# Patient Record
Sex: FEMALE | Race: WHITE | NOT HISPANIC OR LATINO | URBAN - METROPOLITAN AREA
[De-identification: names, ages, dates, MRNs, and addresses within clinical notes are randomized per-mention and may not be internally consistent; named-entity substitution may affect disease eponyms.]

---

## 2017-01-03 ENCOUNTER — OFFICE VISIT - HEALTHEAST (OUTPATIENT)
Dept: FAMILY MEDICINE | Facility: CLINIC | Age: 47
End: 2017-01-03

## 2017-01-03 DIAGNOSIS — S39.012A BACK STRAIN, INITIAL ENCOUNTER: ICD-10-CM

## 2017-02-14 ENCOUNTER — OFFICE VISIT - HEALTHEAST (OUTPATIENT)
Dept: INTERNAL MEDICINE | Facility: CLINIC | Age: 47
End: 2017-02-14

## 2017-02-14 DIAGNOSIS — R14.0 BLOATING: ICD-10-CM

## 2017-02-14 DIAGNOSIS — G56.02 CARPAL TUNNEL SYNDROME OF LEFT WRIST: ICD-10-CM

## 2017-02-14 DIAGNOSIS — F10.90 ALCOHOL USE DISORDER: ICD-10-CM

## 2017-02-14 DIAGNOSIS — Z00.00 HEALTH CARE MAINTENANCE: ICD-10-CM

## 2017-02-15 ENCOUNTER — COMMUNICATION - HEALTHEAST (OUTPATIENT)
Dept: INTERNAL MEDICINE | Facility: CLINIC | Age: 47
End: 2017-02-15

## 2017-02-21 ENCOUNTER — RECORDS - HEALTHEAST (OUTPATIENT)
Dept: ADMINISTRATIVE | Facility: OTHER | Age: 47
End: 2017-02-21

## 2017-02-24 ENCOUNTER — RECORDS - HEALTHEAST (OUTPATIENT)
Dept: ADMINISTRATIVE | Facility: OTHER | Age: 47
End: 2017-02-24

## 2017-02-28 ENCOUNTER — HOSPITAL ENCOUNTER (OUTPATIENT)
Dept: MAMMOGRAPHY | Facility: HOSPITAL | Age: 47
Discharge: HOME | End: 2017-02-28

## 2017-02-28 DIAGNOSIS — Z00.00 HEALTH CARE MAINTENANCE: ICD-10-CM

## 2017-03-09 ENCOUNTER — COMMUNICATION - HEALTHEAST (OUTPATIENT)
Dept: INTERNAL MEDICINE | Facility: CLINIC | Age: 47
End: 2017-03-09

## 2017-03-15 ENCOUNTER — OFFICE VISIT - HEALTHEAST (OUTPATIENT)
Dept: FAMILY MEDICINE | Facility: CLINIC | Age: 47
End: 2017-03-15

## 2017-03-15 DIAGNOSIS — M25.532 LEFT WRIST PAIN: ICD-10-CM

## 2017-03-30 ENCOUNTER — OFFICE VISIT - HEALTHEAST (OUTPATIENT)
Dept: BEHAVIORAL HEALTH | Facility: CLINIC | Age: 47
End: 2017-03-30

## 2017-03-30 DIAGNOSIS — F10.10 MILD ALCOHOL USE DISORDER: ICD-10-CM

## 2017-03-30 DIAGNOSIS — F33.1 MAJOR DEPRESSIVE DISORDER, RECURRENT EPISODE, MODERATE (H): ICD-10-CM

## 2017-04-05 ENCOUNTER — RECORDS - HEALTHEAST (OUTPATIENT)
Dept: ADMINISTRATIVE | Facility: OTHER | Age: 47
End: 2017-04-05

## 2017-04-14 ENCOUNTER — HOSPITAL ENCOUNTER (OUTPATIENT)
Dept: MRI IMAGING | Facility: HOSPITAL | Age: 47
Discharge: HOME | End: 2017-04-14
Attending: PSYCHIATRY & NEUROLOGY

## 2017-04-14 DIAGNOSIS — R20.2 PARESTHESIA: ICD-10-CM

## 2017-06-26 ENCOUNTER — COMMUNICATION - HEALTHEAST (OUTPATIENT)
Dept: INTERNAL MEDICINE | Facility: CLINIC | Age: 47
End: 2017-06-26

## 2018-04-17 ENCOUNTER — RECORDS - HEALTHEAST (OUTPATIENT)
Dept: ADMINISTRATIVE | Facility: OTHER | Age: 48
End: 2018-04-17

## 2018-04-21 ENCOUNTER — RECORDS - HEALTHEAST (OUTPATIENT)
Dept: ADMINISTRATIVE | Facility: OTHER | Age: 48
End: 2018-04-21

## 2018-07-25 ENCOUNTER — OFFICE VISIT - HEALTHEAST (OUTPATIENT)
Dept: FAMILY MEDICINE | Facility: CLINIC | Age: 48
End: 2018-07-25

## 2018-07-25 DIAGNOSIS — G47.00 INSOMNIA: ICD-10-CM

## 2018-07-25 DIAGNOSIS — Z72.0 TOBACCO USE: ICD-10-CM

## 2018-07-25 DIAGNOSIS — F32.1 MODERATE SINGLE CURRENT EPISODE OF MAJOR DEPRESSIVE DISORDER (H): ICD-10-CM

## 2018-07-25 DIAGNOSIS — G56.02 CARPAL TUNNEL SYNDROME OF LEFT WRIST: ICD-10-CM

## 2018-07-25 DIAGNOSIS — Z12.31 VISIT FOR SCREENING MAMMOGRAM: ICD-10-CM

## 2018-07-25 DIAGNOSIS — Z00.00 ROUTINE GENERAL MEDICAL EXAMINATION AT A HEALTH CARE FACILITY: ICD-10-CM

## 2018-07-25 LAB
ALBUMIN SERPL-MCNC: 4 G/DL (ref 3.5–5)
ALP SERPL-CCNC: 72 U/L (ref 45–120)
ALT SERPL W P-5'-P-CCNC: 26 U/L (ref 0–45)
ANION GAP SERPL CALCULATED.3IONS-SCNC: 8 MMOL/L (ref 5–18)
AST SERPL W P-5'-P-CCNC: 31 U/L (ref 0–40)
BASOPHILS # BLD AUTO: 0 THOU/UL (ref 0–0.2)
BASOPHILS NFR BLD AUTO: 1 % (ref 0–2)
BILIRUB SERPL-MCNC: 0.7 MG/DL (ref 0–1)
BUN SERPL-MCNC: 10 MG/DL (ref 8–22)
CALCIUM SERPL-MCNC: 10.1 MG/DL (ref 8.5–10.5)
CHLORIDE BLD-SCNC: 106 MMOL/L (ref 98–107)
CHOLEST SERPL-MCNC: 232 MG/DL
CO2 SERPL-SCNC: 27 MMOL/L (ref 22–31)
CREAT SERPL-MCNC: 0.74 MG/DL (ref 0.6–1.1)
EOSINOPHIL # BLD AUTO: 0.2 THOU/UL (ref 0–0.4)
EOSINOPHIL NFR BLD AUTO: 2 % (ref 0–6)
ERYTHROCYTE [DISTWIDTH] IN BLOOD BY AUTOMATED COUNT: 10.8 % (ref 11–14.5)
FASTING STATUS PATIENT QL REPORTED: ABNORMAL
GFR SERPL CREATININE-BSD FRML MDRD: >60 ML/MIN/1.73M2
GLUCOSE BLD-MCNC: 110 MG/DL (ref 70–125)
HCT VFR BLD AUTO: 45 % (ref 35–47)
HDLC SERPL-MCNC: 84 MG/DL
HGB BLD-MCNC: 15 G/DL (ref 12–16)
LDLC SERPL CALC-MCNC: 117 MG/DL
LYMPHOCYTES # BLD AUTO: 1.9 THOU/UL (ref 0.8–4.4)
LYMPHOCYTES NFR BLD AUTO: 27 % (ref 20–40)
MCH RBC QN AUTO: 33.6 PG (ref 27–34)
MCHC RBC AUTO-ENTMCNC: 33.2 G/DL (ref 32–36)
MCV RBC AUTO: 101 FL (ref 80–100)
MONOCYTES # BLD AUTO: 0.6 THOU/UL (ref 0–0.9)
MONOCYTES NFR BLD AUTO: 8 % (ref 2–10)
NEUTROPHILS # BLD AUTO: 4.3 THOU/UL (ref 2–7.7)
NEUTROPHILS NFR BLD AUTO: 62 % (ref 50–70)
PLATELET # BLD AUTO: 152 THOU/UL (ref 140–440)
PMV BLD AUTO: 8.1 FL (ref 7–10)
POTASSIUM BLD-SCNC: 4.5 MMOL/L (ref 3.5–5)
PROT SERPL-MCNC: 7 G/DL (ref 6–8)
RBC # BLD AUTO: 4.45 MILL/UL (ref 3.8–5.4)
SODIUM SERPL-SCNC: 141 MMOL/L (ref 136–145)
TRIGL SERPL-MCNC: 155 MG/DL
TSH SERPL DL<=0.005 MIU/L-ACNC: 0.97 UIU/ML (ref 0.3–5)
WBC: 6.9 THOU/UL (ref 4–11)

## 2018-07-25 ASSESSMENT — MIFFLIN-ST. JEOR: SCORE: 1232.09

## 2018-07-26 ENCOUNTER — COMMUNICATION - HEALTHEAST (OUTPATIENT)
Dept: FAMILY MEDICINE | Facility: CLINIC | Age: 48
End: 2018-07-26

## 2018-07-27 ENCOUNTER — HOSPITAL ENCOUNTER (OUTPATIENT)
Dept: MAMMOGRAPHY | Facility: CLINIC | Age: 48
Discharge: HOME | End: 2018-07-27
Attending: FAMILY MEDICINE

## 2018-07-27 DIAGNOSIS — Z12.31 VISIT FOR SCREENING MAMMOGRAM: ICD-10-CM

## 2018-07-31 ENCOUNTER — COMMUNICATION - HEALTHEAST (OUTPATIENT)
Dept: FAMILY MEDICINE | Facility: CLINIC | Age: 48
End: 2018-07-31

## 2018-08-01 ENCOUNTER — COMMUNICATION - HEALTHEAST (OUTPATIENT)
Dept: FAMILY MEDICINE | Facility: CLINIC | Age: 48
End: 2018-08-01

## 2018-08-01 LAB
BKR LAB AP ABNORMAL BLEEDING: YES
BKR LAB AP BIRTH CONTROL/HORMONES: NORMAL
BKR LAB AP CERVICAL APPEARANCE: NORMAL
BKR LAB AP GYN ADEQUACY: NORMAL
BKR LAB AP GYN INTERPRETATION: NORMAL
BKR LAB AP HPV REFLEX: NORMAL
BKR LAB AP LMP: 2007
BKR LAB AP PATIENT STATUS: NORMAL
BKR LAB AP PREVIOUS ABNORMAL: NORMAL
BKR LAB AP PREVIOUS NORMAL: 2015
HIGH RISK?: NO
PATH REPORT.COMMENTS IMP SPEC: NORMAL
RESULT FLAG (HE HISTORICAL CONVERSION): NORMAL

## 2018-09-17 ENCOUNTER — OFFICE VISIT - HEALTHEAST (OUTPATIENT)
Dept: FAMILY MEDICINE | Facility: CLINIC | Age: 48
End: 2018-09-17

## 2018-09-17 DIAGNOSIS — R03.0 ELEVATED BP WITHOUT DIAGNOSIS OF HYPERTENSION: ICD-10-CM

## 2018-09-17 ASSESSMENT — MIFFLIN-ST. JEOR: SCORE: 1240.03

## 2018-09-20 ENCOUNTER — OFFICE VISIT - HEALTHEAST (OUTPATIENT)
Dept: FAMILY MEDICINE | Facility: CLINIC | Age: 48
End: 2018-09-20

## 2018-09-20 DIAGNOSIS — I10 ESSENTIAL HYPERTENSION, BENIGN: ICD-10-CM

## 2018-09-20 DIAGNOSIS — Z72.0 TOBACCO USE: ICD-10-CM

## 2018-09-20 DIAGNOSIS — G56.02 CARPAL TUNNEL SYNDROME OF LEFT WRIST: ICD-10-CM

## 2018-09-20 ASSESSMENT — MIFFLIN-ST. JEOR: SCORE: 1267.7

## 2018-11-15 ENCOUNTER — COMMUNICATION - HEALTHEAST (OUTPATIENT)
Dept: FAMILY MEDICINE | Facility: CLINIC | Age: 48
End: 2018-11-15

## 2018-11-15 DIAGNOSIS — R03.0 ELEVATED BP WITHOUT DIAGNOSIS OF HYPERTENSION: ICD-10-CM

## 2018-11-15 DIAGNOSIS — Z71.6 ENCOUNTER FOR TOBACCO USE CESSATION COUNSELING: ICD-10-CM

## 2018-11-15 DIAGNOSIS — G47.00 INSOMNIA: ICD-10-CM

## 2018-11-17 ENCOUNTER — COMMUNICATION - HEALTHEAST (OUTPATIENT)
Dept: SCHEDULING | Facility: CLINIC | Age: 48
End: 2018-11-17

## 2019-01-28 ENCOUNTER — OFFICE VISIT - HEALTHEAST (OUTPATIENT)
Dept: FAMILY MEDICINE | Facility: CLINIC | Age: 49
End: 2019-01-28

## 2019-01-28 DIAGNOSIS — R14.0 BLOATING SYMPTOM: ICD-10-CM

## 2019-01-28 DIAGNOSIS — G56.02 CARPAL TUNNEL SYNDROME OF LEFT WRIST: ICD-10-CM

## 2019-01-28 DIAGNOSIS — G47.00 INSOMNIA, UNSPECIFIED TYPE: ICD-10-CM

## 2019-01-28 DIAGNOSIS — F33.2 SEVERE EPISODE OF RECURRENT MAJOR DEPRESSIVE DISORDER, WITHOUT PSYCHOTIC FEATURES (H): ICD-10-CM

## 2019-01-28 DIAGNOSIS — I10 ESSENTIAL HYPERTENSION, BENIGN: ICD-10-CM

## 2019-01-28 DIAGNOSIS — R19.7 DIARRHEA, UNSPECIFIED TYPE: ICD-10-CM

## 2019-01-28 ASSESSMENT — MIFFLIN-ST. JEOR: SCORE: 1263.28

## 2019-05-13 ENCOUNTER — COMMUNICATION - HEALTHEAST (OUTPATIENT)
Dept: FAMILY MEDICINE | Facility: CLINIC | Age: 49
End: 2019-05-13

## 2019-05-13 DIAGNOSIS — F32.1 MODERATE SINGLE CURRENT EPISODE OF MAJOR DEPRESSIVE DISORDER (H): ICD-10-CM

## 2019-05-15 ENCOUNTER — COMMUNICATION - HEALTHEAST (OUTPATIENT)
Dept: FAMILY MEDICINE | Facility: CLINIC | Age: 49
End: 2019-05-15

## 2019-07-24 ENCOUNTER — OFFICE VISIT - HEALTHEAST (OUTPATIENT)
Dept: FAMILY MEDICINE | Facility: CLINIC | Age: 49
End: 2019-07-24

## 2019-07-24 ENCOUNTER — COMMUNICATION - HEALTHEAST (OUTPATIENT)
Dept: FAMILY MEDICINE | Facility: CLINIC | Age: 49
End: 2019-07-24

## 2019-07-24 DIAGNOSIS — G47.00 INSOMNIA, UNSPECIFIED TYPE: ICD-10-CM

## 2019-07-24 DIAGNOSIS — Z71.6 ENCOUNTER FOR TOBACCO USE CESSATION COUNSELING: ICD-10-CM

## 2019-07-24 DIAGNOSIS — G56.02 CARPAL TUNNEL SYNDROME OF LEFT WRIST: ICD-10-CM

## 2019-07-24 DIAGNOSIS — F41.9 ANXIETY: ICD-10-CM

## 2019-07-24 DIAGNOSIS — F32.1 CURRENT MODERATE EPISODE OF MAJOR DEPRESSIVE DISORDER, UNSPECIFIED WHETHER RECURRENT (H): ICD-10-CM

## 2019-07-24 DIAGNOSIS — F32.1 MODERATE SINGLE CURRENT EPISODE OF MAJOR DEPRESSIVE DISORDER (H): ICD-10-CM

## 2019-07-24 RX ORDER — NICOTINE 21 MG/24HR
1 PATCH, TRANSDERMAL 24 HOURS TRANSDERMAL EVERY 24 HOURS
Qty: 30 PATCH | Refills: 3 | Status: SHIPPED | OUTPATIENT
Start: 2019-07-24 | End: 2021-11-03

## 2019-07-24 RX ORDER — TRAMADOL HYDROCHLORIDE 200 MG/1
200 TABLET, EXTENDED RELEASE ORAL DAILY
Qty: 30 TABLET | Refills: 0 | Status: SHIPPED | OUTPATIENT
Start: 2019-07-24 | End: 2021-11-03

## 2019-07-24 ASSESSMENT — MIFFLIN-ST. JEOR: SCORE: 1225.97

## 2019-07-26 ENCOUNTER — COMMUNICATION - HEALTHEAST (OUTPATIENT)
Dept: FAMILY MEDICINE | Facility: CLINIC | Age: 49
End: 2019-07-26

## 2019-07-29 ENCOUNTER — COMMUNICATION - HEALTHEAST (OUTPATIENT)
Dept: FAMILY MEDICINE | Facility: CLINIC | Age: 49
End: 2019-07-29

## 2019-08-04 ENCOUNTER — COMMUNICATION - HEALTHEAST (OUTPATIENT)
Dept: FAMILY MEDICINE | Facility: CLINIC | Age: 49
End: 2019-08-04

## 2019-08-04 DIAGNOSIS — F32.1 MODERATE SINGLE CURRENT EPISODE OF MAJOR DEPRESSIVE DISORDER (H): ICD-10-CM

## 2019-08-06 ENCOUNTER — RECORDS - HEALTHEAST (OUTPATIENT)
Dept: ADMINISTRATIVE | Facility: OTHER | Age: 49
End: 2019-08-06

## 2019-11-24 ENCOUNTER — COMMUNICATION - HEALTHEAST (OUTPATIENT)
Dept: FAMILY MEDICINE | Facility: CLINIC | Age: 49
End: 2019-11-24

## 2019-11-24 DIAGNOSIS — G47.00 INSOMNIA: ICD-10-CM

## 2019-11-24 RX ORDER — TRAZODONE HYDROCHLORIDE 100 MG/1
100 TABLET ORAL AT BEDTIME
Qty: 30 TABLET | Refills: 9 | Status: SHIPPED | OUTPATIENT
Start: 2019-11-24 | End: 2021-11-03

## 2020-04-21 ENCOUNTER — COMMUNICATION - HEALTHEAST (OUTPATIENT)
Dept: FAMILY MEDICINE | Facility: CLINIC | Age: 50
End: 2020-04-21

## 2020-04-21 DIAGNOSIS — F32.1 MODERATE SINGLE CURRENT EPISODE OF MAJOR DEPRESSIVE DISORDER (H): ICD-10-CM

## 2020-05-04 ENCOUNTER — COMMUNICATION - HEALTHEAST (OUTPATIENT)
Dept: FAMILY MEDICINE | Facility: CLINIC | Age: 50
End: 2020-05-04

## 2020-05-04 DIAGNOSIS — F41.9 ANXIETY: ICD-10-CM

## 2020-07-17 ENCOUNTER — COMMUNICATION - HEALTHEAST (OUTPATIENT)
Dept: FAMILY MEDICINE | Facility: CLINIC | Age: 50
End: 2020-07-17

## 2020-07-17 DIAGNOSIS — F41.9 ANXIETY: ICD-10-CM

## 2020-07-18 RX ORDER — BUSPIRONE HYDROCHLORIDE 5 MG/1
5 TABLET ORAL 2 TIMES DAILY
Qty: 180 TABLET | Refills: 0 | Status: SHIPPED | OUTPATIENT
Start: 2020-07-18 | End: 2021-11-03

## 2021-05-16 ENCOUNTER — COMMUNICATION - HEALTHEAST (OUTPATIENT)
Dept: FAMILY MEDICINE | Facility: CLINIC | Age: 51
End: 2021-05-16

## 2021-05-16 DIAGNOSIS — F32.1 MODERATE SINGLE CURRENT EPISODE OF MAJOR DEPRESSIVE DISORDER (H): ICD-10-CM

## 2021-05-17 ENCOUNTER — COMMUNICATION - HEALTHEAST (OUTPATIENT)
Dept: FAMILY MEDICINE | Facility: CLINIC | Age: 51
End: 2021-05-17

## 2021-05-28 NOTE — TELEPHONE ENCOUNTER
Patient Returning Call  Reason for call:  Medication refill  Information relayed to patient:  Scheduled appointment for 5/14 at 3:00  Patient has additional questions:  No  If YES, what are your questions/concerns:    Okay to leave a detailed message?: No call back needed

## 2021-05-30 VITALS — WEIGHT: 129.9 LBS | BODY MASS INDEX: 20.35 KG/M2

## 2021-05-30 VITALS — BODY MASS INDEX: 21.07 KG/M2 | WEIGHT: 134.5 LBS

## 2021-05-30 VITALS — WEIGHT: 134.5 LBS | BODY MASS INDEX: 21.07 KG/M2

## 2021-05-30 NOTE — TELEPHONE ENCOUNTER
Prior Authorization Request  Who s requesting:  Patient  Pharmacy Name and Location: [id number] [facility] [[place]][address]  Medication Name:   traZODone (DESYREL) 100 MG tablet 30 tablet 11 11/17/2018  No   Sig - Route: Take 1 tablet (100 mg total) by mouth at bedtime. - Oral   Sent to pharmacy as: traZODone (DESYREL) 100 MG tablet       Insurance Plan: MEDICA  Insurance Member ID Number:  [id number]  Informed patient that prior authorizations can take up to 10 business days for response:   Yes  Okay to leave a detailed message: Yes

## 2021-05-30 NOTE — PROGRESS NOTES
Family Medicine Office Visit  [facility]  Patient Name: Angelic Damon  Patient Age: 49 y.o.  YOB: 1970  MRN: [id number]    Date of Visit: 2019  Reason for Office Visit:   Chief Complaint   Patient presents with     med check     tramadol- anxiety      Left wrist pain           Assessment / Plan / Medical Decision Makin. Encounter for tobacco use cessation counseling  - nicotine (NICODERM CQ) 21 mg/24 hr; Place 1 patch on the skin daily.  Dispense: 30 patch; Refill: 3    2. Carpal tunnel syndrome of left wrist  Refill medication and will do another referral to orthopedics  - traMADol (ULTRAM-ER) 200 MG 24 hr tablet; Take 1 tablet (200 mg total) by mouth daily.  Dispense: 30 tablet; Refill: 0  - Ambulatory referral to Orthopedics    3. Insomnia, unspecified type  Will see if related to anxiety and improve with medication changes    4. Current moderate episode of major depressive disorder, unspecified whether recurrent (H)  PHQ 9 done and noted to be 14 - plan to increase medication    5. Anxiety  Start buspar and see if any improvement in symptoms  - busPIRone (BUSPAR) 5 MG tablet; Take 1 tablet (5 mg total) by mouth 2 (two) times a day.  Dispense: 60 tablet; Refill: 3    6. Moderate single current episode of major depressive disorder (H)  Will increase the zoloft to 100mg and see if any  improvement  - sertraline (ZOLOFT) 100 MG tablet; Take 1 tablet (100 mg total) by mouth daily.  Dispense: 30 tablet; Refill: 6        Health Maintenance Review  Health Maintenance   Topic Date Due     DEPRESSION FOLLOW UP  1970     HIV SCREENING  1986     MAMMOGRAM  2019     PREVENTIVE CARE VISIT  2019     INFLUENZA VACCINE RULE BASED (1) 2019     ADVANCE CARE PLANNING  2022     TD 18+ HE  2023     PAP SMEAR  2023     TDAP ADULT ONE TIME DOSE  Addressed         I have changed Alison Damon's sertraline. I am also having  her start on busPIRone. Additionally, I am having her maintain her calcium-vitamin D, traZODone, nicotine, and traMADol.      HPI:  Angelic Damon is a 49 y.o. year old who presents to the office today for follow up on anxiety.  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  Planning on moving but too much drama with family she says and not doing that any longer.  Currently on zoloft for anxiety and depression and feels like helping more with the depression.  Taking trazodone for sleeping and not like to take it as it makes her groggy but then restless all night.  PHQ9 14.      Needing to get carpal tunnel surgery and not followed through with it due to work.  Smoking still and trying to quit.        Review of Systems- pertinent positive in bold:  Constitutional: Fever, chills, night sweats, fainting, weight change, fatigue, seizures, dizziness, sleeping difficulties, loud snoring/pauses in breathing  Eyes: change in vision, blurred or double vision, redness/eye pain  Ears, nose, mouth, throat: change in hearing, ear pain, hoarseness, difficulty swallowing, sores in the mouth or throat  Respiratory: shortness of breath, cough, bloody sputum, wheezing  Cardiovascular: chest pain, palpitations   Gastrointestinal: abdominal pain, heartburn/indigestion, nausea/vomiting, change in appetite, change in bowel habits, constipation or diarrhea, rectal bleeding/dark stools, difficulty swallowing  Urinary: painful urination, frequent urination, urinary urgency/incontinence, blood in urine/dark urine, nocturia  Musculoskeletal: backache/back pain (new or increasing), weakness, joint pain/stiffness (new or increasing), muscle cramps, swelling of hands, feet, ankles, leg pain/redness  Skin: change in moles/freckles, rash, nodules  Hematologic/lymphatic: swollen lymph glands, abnormal bruising/bleeding  Endocrine: excessive thirst/urination, cold or heat intolerance  Neurologic/emotional: worrisome  "memory change, numbness/tingling, anxiety, mood swings      Current Scheduled Meds:  Outpatient Encounter Medications as of 7/24/2019   Medication Sig Dispense Refill     calcium-vitamin D 500 mg(1,250mg) -200 unit per tablet Take 1 tablet by mouth 2 (two) times a day with meals. 180 tablet 0     sertraline (ZOLOFT) 100 MG tablet Take 1 tablet (100 mg total) by mouth daily. 30 tablet 6     traMADol (ULTRAM-ER) 200 MG 24 hr tablet Take 1 tablet (200 mg total) by mouth daily. 30 tablet 0     traZODone (DESYREL) 100 MG tablet Take 1 tablet (100 mg total) by mouth at bedtime. 30 tablet 11     [DISCONTINUED] sertraline (ZOLOFT) 50 MG tablet Take 1 tablet (50 mg total) by mouth daily. 30 tablet 2     [DISCONTINUED] traMADol (ULTRAM-ER) 200 MG 24 hr tablet Take 1 tablet (200 mg total) by mouth daily. 30 tablet 0     busPIRone (BUSPAR) 5 MG tablet Take 1 tablet (5 mg total) by mouth 2 (two) times a day. 60 tablet 3     nicotine (NICODERM CQ) 21 mg/24 hr Place 1 patch on the skin daily. 30 patch 3     [DISCONTINUED] nicotine (NICODERM CQ) 21 mg/24 hr Place 1 patch on the skin daily. 30 patch 11     No facility-administered encounter medications on file as of 7/24/2019.      Past Medical History:   Diagnosis Date     Alcoholism (H)      Anxiety      Arthritis      Depression      Postmenopausal      No past surgical history on file.  Social History     Tobacco Use     Smoking status: Current Some Day Smoker     Packs/day: 1.00     Years: 12.00     Pack years: 12.00     Smokeless tobacco: Never Used   Substance Use Topics     Alcohol use: Yes     Alcohol/week: 4.0 oz     Types: 8 Standard drinks or equivalent per week     Comment: 4 drinks/drinking episode. Usually wine or beer      Drug use: No       Objective / Physical Examination:  Vitals:    07/24/19 0920   BP: 116/74   Patient Site: Right Arm   Patient Position: Sitting   Cuff Size: Adult Regular   Pulse: 73   SpO2: 99%   Weight: 129 lb 14.4 oz (58.9 kg)   Height: 5' 6\" " (1.676 m)     Wt Readings from Last 3 Encounters:   07/24/19 129 lb 14.4 oz (58.9 kg)   01/28/19 138 lb 2 oz (62.7 kg)   11/11/18 128 lb (58.1 kg)     BP Readings from Last 3 Encounters:   07/24/19 116/74   01/28/19 138/80   11/12/18 (!) 156/99     Body mass index is 20.97 kg/m .   Results for orders placed or performed during the hospital encounter of 11/11/18   HM2 (CBC W/O DIFF)   Result Value Ref Range    WBC 6.9 4.0 - 11.0 thou/uL    RBC 4.00 3.80 - 5.40 mill/uL    Hemoglobin 13.3 12.0 - 16.0 g/dL    Hematocrit 39.3 35.0 - 47.0 %    MCV 98 80 - 100 fL    MCH 33.3 27.0 - 34.0 pg    MCHC 33.8 32.0 - 36.0 g/dL    RDW 12.3 11.0 - 14.5 %    Platelets 168 140 - 440 thou/uL    MPV 10.3 8.5 - 12.5 fL   Basic Metabolic Panel   Result Value Ref Range    Sodium 143 136 - 145 mmol/L    Potassium 4.0 3.5 - 5.0 mmol/L    Chloride 108 (H) 98 - 107 mmol/L    CO2 25 22 - 31 mmol/L    Anion Gap, Calculation 10 5 - 18 mmol/L    Glucose 79 70 - 125 mg/dL    Calcium 9.5 8.5 - 10.5 mg/dL    BUN 7 (L) 8 - 22 mg/dL    Creatinine 0.69 0.60 - 1.10 mg/dL    GFR MDRD Af Amer >60 >60 mL/min/1.73m2    GFR MDRD Non Af Amer >60 >60 mL/min/1.73m2   Lipase   Result Value Ref Range    Lipase 34 0 - 52 U/L   Hepatic Profile   Result Value Ref Range    Bilirubin, Total 0.2 0.0 - 1.0 mg/dL    Bilirubin, Direct <0.1 <=0.5 mg/dL    Protein, Total 7.3 6.0 - 8.0 g/dL    Albumin 4.1 3.5 - 5.0 g/dL    Alkaline Phosphatase 72 45 - 120 U/L    AST 30 0 - 40 U/L    ALT 26 0 - 45 U/L   Troponin I   Result Value Ref Range    Troponin I <0.01 0.00 - 0.29 ng/mL   Troponin I   Result Value Ref Range    Troponin I <0.01 0.00 - 0.29 ng/mL   ECG 12 lead nursing unit performed   Result Value Ref Range    SYSTOLIC BLOOD PRESSURE  mmHg    DIASTOLIC BLOOD PRESSURE  mmHg    VENTRICULAR RATE 78 BPM    ATRIAL RATE 78 BPM    P-R INTERVAL 150 ms    QRS DURATION 86 ms    Q-T INTERVAL 398 ms    QTC CALCULATION (BEZET) 453 ms    P Axis 70 degrees    R AXIS 49 degrees    T AXIS  43 degrees    MUSE DIAGNOSIS       Normal sinus rhythm  Possible Anterior infarct , age undetermined  Abnormal ECG  No previous ECGs available  Confirmed by RAJ EDUARDO MD LOC:JN ([id number]) on 11/12/2018 4:58:09 PM     ECG 12 lead nursing unit performed   Result Value Ref Range    SYSTOLIC BLOOD PRESSURE  mmHg    DIASTOLIC BLOOD PRESSURE  mmHg    VENTRICULAR RATE 59 BPM    ATRIAL RATE 59 BPM    P-R INTERVAL 164 ms    QRS DURATION 90 ms    Q-T INTERVAL 450 ms    QTC CALCULATION (BEZET) 445 ms    P Axis 68 degrees    R AXIS 53 degrees    T AXIS 48 degrees    MUSE DIAGNOSIS       Sinus bradycardia with sinus arrhythmia  Otherwise normal ECG  When compared with ECG of 11-NOV-2018 22:47,  No significant change was found  Confirmed by RAJ EDUARDO MD LOC:[id number]) on 11/12/2018 4:59:20 PM             General Appearance: Alert and oriented, cooperative, affect appropriate, speech clear, in no apparent distress  Head: Normocephalic, atraumatic  Lungs: Clear to auscultation bilaterally. Normal inspiratory and expiratory effort  Cardiovascular: Regular rate, normal S1, S2. No murmurs, rubs, or gallops  Abdomen: Bowel sounds active all four quadrants. Soft, non-tender. No hepatomegaly or splenomegaly. No bruits detected.   Extremities: Pulses 2+ and equal throughout. No edema. Strength equal throughout.  Integumentary: Warm and dry. Without suspicious looking lesions  Neuro: Alert and oriented, follows commands appropriately.     Orders Placed This Encounter   Procedures     Ambulatory referral to Orthopedics   Followup: No follow-ups on file. earlier if needed.    Total time spent with patient was 30 min with >50% of time spent in face-to-face counseling regarding the above plan       Toma Justin MD

## 2021-05-30 NOTE — TELEPHONE ENCOUNTER
Central PA team  [phone]  Pool: HE PA MED ([id number])          PA has been initiated.       PA form completed and faxed insurance via Cover My Meds     Key:  [id number]       Medication: traMADol (ULTRAM-ER) 200 MG 24 hr tablet    Insurance:  [facility]          Response will be received via fax and may take up to 5-10 business days depending on plan

## 2021-05-30 NOTE — TELEPHONE ENCOUNTER
Fax received from [facility], they have started the Prior Authorization Process via Cover My Meds    CoverMyMeds Key: [id number]     Medication Name:   traMADol (ULTRAM-ER) 200 MG 24 hr tablet 30 tablet 0 7/24/2019     Sig - Route: Take 1 tablet (200 mg total) by mouth daily. - Oral    Sent to pharmacy as: traMADol (ULTRAM-ER) 200 MG 24 hr tablet    E-Prescribing Status: Receipt confirmed by pharmacy (7/24/2019  9:35 AM CDT)          Insurance Plan: [employer]  PBM: [employer]  Patient ID:  Not provided on fax    Please complete the PA process

## 2021-05-30 NOTE — TELEPHONE ENCOUNTER
Medication Question or Clarification  Who is calling: Pharmacy: Adeline from [facility] in [place]  What medication are you calling about? (include dose and sig)     traMADol (ULTRAM-ER) 200 MG 24 hr tablet 30 tablet 0 7/24/2019     Sig - Route: Take 1 tablet (200 mg total) by mouth daily. - Oral      Who prescribed the medication?: pcp  What is your question/concern?: Pharmacy asking if this for an acute or chronic condition? Please advise.  Pharmacy: [facility] [place]  Okay to leave a detailed message?: Yes  Site CMT - Please call the pharmacy to obtain any additional needed information.

## 2021-05-31 NOTE — TELEPHONE ENCOUNTER
Medication does not require a prior auth.  Pharmacy needed a PA for tramadol, which has already been approved.

## 2021-05-31 NOTE — TELEPHONE ENCOUNTER
RN cannot approve Refill Request    RN can NOT refill this medication medication not on med list. Last office visit: 7/24/2019 Toma Justin MD Last Physical: 7/25/2018 Last MTM visit: Visit date not found Last visit same specialty: 7/24/2019 Toma Justin MD.  Next visit within 3 mo: Visit date not found  Next physical within 3 mo: Visit date not found  NOTE: dose increased on 7/24/2019--message sent to pharmacy.     Heather Thorne, Care Connection Triage/Med Refill 8/4/2019    Requested Prescriptions   Pending Prescriptions Disp Refills     sertraline (ZOLOFT) 50 MG tablet [Pharmacy Med Name: Sertraline HCl Oral Tablet 50 MG] 30 tablet 1     Sig: Take 1 tablet (50 mg total) by mouth daily.       SSRI Refill Protocol  Passed - 8/4/2019  7:01 AM        Passed - PCP or prescribing provider visit in last year     Last office visit with prescriber/PCP: 7/24/2019 Toma Justin MD OR same dept: 7/24/2019 Toma Justin MD OR same specialty: 7/24/2019 Toma Justin MD  Last physical: 7/25/2018 Last MTM visit: Visit date not found   Next visit within 3 mo: Visit date not found  Next physical within 3 mo: Visit date not found  Prescriber OR PCP: Toma Justin MD  Last diagnosis associated with med order: 1. Moderate single current episode of major depressive disorder (H)  - sertraline (ZOLOFT) 50 MG tablet [Pharmacy Med Name: Sertraline HCl Oral Tablet 50 MG]; Take 1 tablet (50 mg total) by mouth daily.  Dispense: 30 tablet; Refill: 1    If protocol passes may refill for 12 months if within 3 months of last provider visit (or a total of 15 months).

## 2021-06-01 ENCOUNTER — RECORDS - HEALTHEAST (OUTPATIENT)
Dept: ADMINISTRATIVE | Facility: CLINIC | Age: 51
End: 2021-06-01

## 2021-06-01 VITALS — BODY MASS INDEX: 21.38 KG/M2 | HEIGHT: 66 IN | WEIGHT: 133 LBS

## 2021-06-02 VITALS — BODY MASS INDEX: 21.66 KG/M2 | HEIGHT: 66 IN | WEIGHT: 134.75 LBS

## 2021-06-02 VITALS — BODY MASS INDEX: 22.2 KG/M2 | HEIGHT: 66 IN | WEIGHT: 138.13 LBS

## 2021-06-02 VITALS — HEIGHT: 66 IN | BODY MASS INDEX: 22.35 KG/M2 | WEIGHT: 139.1 LBS

## 2021-06-03 VITALS — WEIGHT: 129.9 LBS | BODY MASS INDEX: 20.88 KG/M2 | HEIGHT: 66 IN

## 2021-06-03 NOTE — TELEPHONE ENCOUNTER
Refill Approved    Rx renewed per Medication Renewal Policy. Medication was last renewed on 11/17/18.    Mackenzie Angulo, Care Connection Triage/Med Refill 11/24/2019     Requested Prescriptions   Pending Prescriptions Disp Refills     traZODone (DESYREL) 100 MG tablet [Pharmacy Med Name: traZODone HCl Oral Tablet 100 MG] 30 tablet 10     Sig: Take 1 tablet (100 mg total) by mouth at bedtime       Tricyclics/Misc Antidepressant/Antianxiety Meds Refill Protocol Passed - 11/24/2019  7:00 AM        Passed - PCP or prescribing provider visit in last year     Last office visit with prescriber/PCP: 7/24/2019 Toma Justin MD OR same dept: 7/24/2019 Toma Justin MD OR same specialty: 7/24/2019 Toma Justin MD  Last physical: 7/25/2018 Last MTM visit: Visit date not found   Next visit within 3 mo: Visit date not found  Next physical within 3 mo: Visit date not found  Prescriber OR PCP: Toma Justin MD  Last diagnosis associated with med order: 1. Insomnia  - traZODone (DESYREL) 100 MG tablet [Pharmacy Med Name: traZODone HCl Oral Tablet 100 MG]; Take 1 tablet (100 mg total) by mouth at bedtime.  Dispense: 30 tablet; Refill: 10    If protocol passes may refill for 12 months if within 3 months of last provider visit (or a total of 15 months).

## 2021-06-07 NOTE — TELEPHONE ENCOUNTER
Patient requested pended medication be refilled, Please sign if appropriate     Zoloft Oral Tablet 100mg     Last Filled- 3/13/2020     Its a 30 day supply     Last OV: 07/24/2019     No future appointments shown

## 2021-06-07 NOTE — TELEPHONE ENCOUNTER
Refill Approved    Rx renewed per Medication Renewal Policy. Medication was last renewed on 19    April Hurd Connection Triage/Med Refill 2020     Requested Prescriptions   Pending Prescriptions Disp Refills     busPIRone (BUSPAR) 5 MG tablet 180 tablet 0     Sig: Take 1 tablet (5 mg total) by mouth 2 (two) times a day.       Tricyclics/Misc Antidepressant/Antianxiety Meds Refill Protocol Passed - 2020  2:55 PM        Passed - PCP or prescribing provider visit in last year     Last office visit with prescriber/PCP: 2019 Toma Justin MD OR same dept: 2019 Toma Justin MD OR same specialty: 2019 Toma Justin MD  Last physical: 2018 Last MTM visit: Visit date not found   Next visit within 3 mo: Visit date not found  Next physical within 3 mo: Visit date not found  Prescriber OR PCP: Toma Justin MD  Last diagnosis associated with med order: 1. Anxiety  - busPIRone (BUSPAR) 5 MG tablet; Take 1 tablet (5 mg total) by mouth 2 (two) times a day.  Dispense: 60 tablet; Refill: 3    If protocol passes may refill for 12 months if within 3 months of last provider visit (or a total of 15 months).                  Last office visit: 19    Assessment / Plan / Medical Decision Makin. Encounter for tobacco use cessation counseling  - nicotine (NICODERM CQ) 21 mg/24 hr; Place 1 patch on the skin daily.  Dispense: 30 patch; Refill: 3     2. Carpal tunnel syndrome of left wrist  Refill medication and will do another referral to orthopedics  - traMADol (ULTRAM-ER) 200 MG 24 hr tablet; Take 1 tablet (200 mg total) by mouth daily.  Dispense: 30 tablet; Refill: 0  - Ambulatory referral to Orthopedics     3. Insomnia, unspecified type  Will see if related to anxiety and improve with medication changes     4. Current moderate episode of major depressive disorder, unspecified whether recurrent (H)  PHQ 9 done and noted to be 14 - plan to increase medication     5.  Anxiety  Start buspar and see if any improvement in symptoms  - busPIRone (BUSPAR) 5 MG tablet; Take 1 tablet (5 mg total) by mouth 2 (two) times a day.  Dispense: 60 tablet; Refill: 3     6. Moderate single current episode of major depressive disorder (H)  Will increase the zoloft to 100mg and see if any  improvement  - sertraline (ZOLOFT) 100 MG tablet; Take 1 tablet (100 mg total) by mouth daily.  Dispense: 30 tablet; Refill: 6       Last refill: 7/24/19      busPIRone (BUSPAR) 5 MG tablet  60 tablet  3  7/24/2019   --    Sig - Route: Take 1 tablet (5 mg total) by mouth 2 (two) times a day. - Oral    Sent to pharmacy as: busPIRone (BUSPAR) 5 MG tablet    E-Prescribing Status: Receipt confirmed by pharmacy (7/24/2019  9:35 AM CDT)

## 2021-06-07 NOTE — TELEPHONE ENCOUNTER
Refill request: buspirone   Last filled: 7.24.2019 disp 60 3 refills   Last visit: 7.24.2019  5. Anxiety  Start buspar and see if any improvement in symptoms  - busPIRone (BUSPAR) 5 MG tablet; Take 1 tablet (5 mg total) by mouth 2 (two) times a day.  Dispense: 60 tablet; Refill: 3

## 2021-06-08 NOTE — PROGRESS NOTES
ASSESSMENT:   1. Back strain, initial encounter          PLAN:  Counseled differentials- consistent with muscle strain. Recommend application of ice to affected area 3-4 times daily for 15-20 minutes at a time.  Due to her alcohol abuse, recommend avoidance of all NSAIDS.  Additionally, I avoided prescribing any narcotics or muscle relaxers due to her alcohol intake. She may use tylenol 500-1000mg every 4-6 hours as needed for pain but limit use to 2000mg/day and avoid when actively using alcohol.   Use proper lifting with avoidance of heavy lifting discussed.  Call a PCP for recheck first of next week if not responding as expected.  Reviewed red flags of back pain, including severe/worsening pain, numbness/weakness of LE/groin/genitals, incontinence, or fever, these would be reasons to seek care in the ER immediately. Patient handout on back rehabilitation exercises provided (Josh).     SUBJECTIVE: Angelic Damon is a 46 y.o. female who complains of low back pain, L>R, for 1 day.  Aggravated by: prolonged sitting, bending.    Precipitating factors: was lying around most of the day on the couch yesterday, then when she went to stand up, felt immediate pain in her low back.  Prior history of back problems: no prior back problems.  There is no radiation of pain to lower extremities. There is no numbness or weakness in the legs. Has attempted treatment with Excedrin, massage, ice, and a neighbor's Baclofen, which provided her with temporary relief.     Patient has a history of alcohol abuse.  She drinks 3-6 drinks/day everyday.      No recent fever, chills, illness.   No incontinence, saddle-distribution anesthesia, unexpected weight loss or mental status changes.  ROS otherwise noncontributory.    Patient Active Problem List   Diagnosis     Alcohol use disorder, severe, dependence     Memory changes     Depression     Insomnia     Tobacco use        Current Medications:  Current Outpatient Prescriptions on File  Prior to Visit   Medication Sig Dispense Refill     CALCIUM-VITAMIN D 500 mg(1,250mg) -200 unit per tablet        traZODone (DESYREL) 100 MG tablet Take 1 tablet (100 mg total) by mouth bedtime. 30 tablet 2     traZODone (DESYREL) 100 MG tablet Take 1 tablet (100 mg total) by mouth bedtime. 30 tablet 3     No current facility-administered medications on file prior to visit.         Allergies:  No Known Allergies     OBJECTIVE:   Visit Vitals     /80     Pulse 83     Temp 98.4  F (36.9  C) (Oral)     Wt 134 lb 8 oz (61 kg)     SpO2 99%     BMI 21.07 kg/m2        General:Alert, polite, cooperative. Appears healthy. Patient appears to be in mild pain, moving from standing to sitting position.    Lungs: Chest is clear, no wheezing or rales. Symmetric air entry throughout both lung fields.   Heart: regular rate and rhythm, no murmur, rub or gallop  MS: Slow otherwise normal gait noted, including heel and toe walking.  Inspection shows no erythema, inflammation or deformity. There is tenderness along the paravetebrals in the bilateral lumbar region, L>R. There is no spine bony tenderness or mass. Has full AROM of the back, tenderness with forward flexion.  Neurologic: Strength in the major muscle groups of UE/LE intact and equal. DTRs in UE/LE intact and equal. Straight leg raise is negative on both sides.
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
## 2021-06-09 NOTE — TELEPHONE ENCOUNTER
Refill Approved    Rx renewed per Medication Renewal Policy. Medication was last renewed on 5/4/20, last OV 7/24/19.    Heather Araiza, [facility] Triage/Med Refill 7/18/2020     Requested Prescriptions   Pending Prescriptions Disp Refills     busPIRone (BUSPAR) 5 MG tablet [Pharmacy Med Name: busPIRone HCl Oral Tablet 5 MG] 180 tablet 0     Sig: TAKE 1 TABLET (5 MG TOTAL) BY MOUTH 2 (TWO) TIMES A DAY.       Tricyclics/Misc Antidepressant/Antianxiety Meds Refill Protocol Passed - 7/17/2020  9:07 AM        Passed - PCP or prescribing provider visit in last year     Last office visit with prescriber/PCP: 7/24/2019 Toma Justin MD OR same dept: 7/24/2019 Toma Justin MD OR same specialty: 7/24/2019 Toma Justin MD  Last physical: 7/25/2018 Last MTM visit: Visit date not found   Next visit within 3 mo: Visit date not found  Next physical within 3 mo: Visit date not found  Prescriber OR PCP: Toma Justin MD  Last diagnosis associated with med order: 1. Anxiety  - busPIRone (BUSPAR) 5 MG tablet [Pharmacy Med Name: busPIRone HCl Oral Tablet 5 MG]; Take 1 tablet (5 mg total) by mouth 2 (two) times a day.  Dispense: 180 tablet; Refill: 0    If protocol passes may refill for 12 months if within 3 months of last provider visit (or a total of 15 months).

## 2021-06-09 NOTE — PROGRESS NOTES
Subjective:      Angelic Damon is a 46 y.o. female comes in for a work note since he missed work today.  She has been evaluated for left-sided numbness and also left hand and wrist pain for the last several months.  She thinks she was diagnosed with carpal tunnel syndrome.  She still is being worked up by neurology for evaluation of left-sided numbness.  It is recommended that she obtain a MRI to evaluate this further and to rule out MS.  She had a appointment today for evaluation, but she arrived late and she was told she would have to schedule another appointment.    Objective:       Patient is a little anxious otherwise reasonably healthy appearing.    Left hand shows some thenar atrophy of some weakness when squeezing the forefinger and thumb and trying to hold a piece of paper.  Right hand is unremarkable and it has a strong opposing thumb and forefinger grasp.      Assessment/Plan:       Work and to the patient.    I encouraged her to schedule the MRI for further evaluation.    Continue to keep appointments with other providers for workup of her problems and also make another appointment to establish care with one of the PCP providers.

## 2021-06-13 ENCOUNTER — COMMUNICATION - HEALTHEAST (OUTPATIENT)
Dept: FAMILY MEDICINE | Facility: CLINIC | Age: 51
End: 2021-06-13

## 2021-06-13 DIAGNOSIS — F32.1 MODERATE SINGLE CURRENT EPISODE OF MAJOR DEPRESSIVE DISORDER (H): ICD-10-CM

## 2021-06-14 ENCOUNTER — COMMUNICATION - HEALTHEAST (OUTPATIENT)
Dept: FAMILY MEDICINE | Facility: CLINIC | Age: 51
End: 2021-06-14

## 2021-06-14 ENCOUNTER — COMMUNICATION - HEALTHEAST (OUTPATIENT)
Dept: INTERNAL MEDICINE | Facility: CLINIC | Age: 51
End: 2021-06-14

## 2021-06-14 DIAGNOSIS — F32.1 MODERATE SINGLE CURRENT EPISODE OF MAJOR DEPRESSIVE DISORDER (H): ICD-10-CM

## 2021-06-14 RX ORDER — SERTRALINE HYDROCHLORIDE 100 MG/1
100 TABLET, FILM COATED ORAL DAILY
Qty: 30 TABLET | Refills: 1 | Status: SHIPPED | OUTPATIENT
Start: 2021-06-14 | End: 2021-11-03

## 2021-06-16 PROBLEM — F41.9 ANXIETY: Status: ACTIVE | Noted: 2019-07-24

## 2021-06-16 PROBLEM — G56.02 CARPAL TUNNEL SYNDROME OF LEFT WRIST: Status: ACTIVE | Noted: 2018-07-25

## 2021-06-16 PROBLEM — F32.1 CURRENT MODERATE EPISODE OF MAJOR DEPRESSIVE DISORDER, UNSPECIFIED WHETHER RECURRENT (H): Status: ACTIVE | Noted: 2018-07-25

## 2021-06-16 NOTE — TELEPHONE ENCOUNTER
Telephone Encounter by Toma Merrill LPN at 5/13/2019 11:00 AM     Author: Toma Merrill LPN Service: -- Author Type: Licensed Nurse    Filed: 5/13/2019 11:01 AM Encounter Date: 5/13/2019 Status: Signed    : Toma Merrill LPN (Licensed Nurse)       Last visit- 1-    Last refill- 1-    Last plan-  Instructions        Return in about 4 weeks (around 2/25/2019) for Recheck.       Follow up visit- 5-

## 2021-06-16 NOTE — TELEPHONE ENCOUNTER
Telephone Encounter by Nancy Ybarra at 7/29/2019  2:19 PM     Author: Nancy Ybarra Service: -- Author Type: --    Filed: 7/29/2019  2:19 PM Encounter Date: 7/29/2019 Status: Signed    : Nancy Ybarra APPROVED:    Approval start date: 6/29/2019  Approval end date: 7/28/2020    Pharmacy has been notified of approval and will contact patient when medication is ready for pickup.

## 2021-06-17 NOTE — TELEPHONE ENCOUNTER
RN cannot approve Refill Request    RN can NOT refill this medication PCP messaged that patient is overdue for Office Visit. Last office visit: 7/24/2019 Toma Justin MD Last Physical: 7/25/2018 Last MTM visit: Visit date not found Last visit same specialty: 7/24/2019 Toma Justin MD.  Next visit within 3 mo: Visit date not found  Next physical within 3 mo: Visit date not found      Heather Araiza, [facility] Triage/Med Refill 5/16/2021    Requested Prescriptions   Pending Prescriptions Disp Refills     sertraline (ZOLOFT) 100 MG tablet [Pharmacy Med Name: Sertraline HCl Oral Tablet 100 MG] 30 tablet 0     Sig: TAKE 1 TABLET (100 MG TOTAL) BY MOUTH DAILY.       [facility] Refill Protocol  Failed - 5/16/2021  2:02 AM        Failed - PCP or prescribing provider visit in last year     Last office visit with prescriber/PCP: 7/24/2019 Toma Justin MD OR same dept: Visit date not found OR same specialty: 7/24/2019 Toma Justin MD  Last physical: 7/25/2018 Last MTM visit: Visit date not found   Next visit within 3 mo: Visit date not found  Next physical within 3 mo: Visit date not found  Prescriber OR PCP: Toma Justin MD  Last diagnosis associated with med order: 1. Moderate single current episode of major depressive disorder (H)  - sertraline (ZOLOFT) 100 MG tablet [Pharmacy Med Name: Sertraline HCl Oral Tablet 100 MG]; Take 1 tablet (100 mg total) by mouth daily.  Dispense: 30 tablet; Refill: 0    If protocol passes may refill for 12 months if within 3 months of last provider visit (or a total of 15 months).

## 2021-06-19 NOTE — LETTER
Letter by Toma Justin MD at      Author: Toma Justin MD Service: -- Author Type: --    Filed:  Encounter Date: 7/24/2019 Status: (Other)         [facility]  07/24/19    Patient: Angelic Damon  YOB: 1970  Medical Record Number: [id number]  CSN: [id number]                                                                              Non-opioid Controlled Substance Agreement    I understand that my care provider has prescribed a controlled substance to help manage my condition(s). I am taking this medicine to help me function or work. I know this is strong medicine, and that it can cause serious side effects. Controlled substances can be sedating, addicting and may cause a dependency on the drug. They can affect my ability to drive or think, and cause depression. They need to be taken exactly as prescribed. Combining controlled substances with certain medicines or chemicals (such as cocaine, sedatives and tranquilizers, sleeping pills, meth) can be dangerous or even fatal. Also, if I stop controlled substances suddenly, I may have severe withdrawal symptoms.  If not helpful, I may be asked to stop them.    The risks, benefits, and side effects of these medicine(s) were explained to me. I agree that:    1. I will take part in other treatments as advised by my care team. This may be psychiatry or counseling, physical therapy, behavioral therapy, group treatment or a referral to a pain clinic. I will reduce or stop my medicine when my care team tells me to do so.  2. I will take my medicines as prescribed. I will not change the dose or schedule unless my care team tells me to. There will be no refills if I run out early.  I may be contactedwithout warning and asked to complete a urine drug test or pill count at any time.   3. I will keep all my appointments, and understand this is part of the monitoring of controlled substances. My care team may require an office visit for EVERY  controlled substance refill. If I miss appointments or dont follow instructions, my care team may stop my medicine.  4. I will not ask other providers to prescribe controlled substances, and I will not accept controlled substances from other people. If I need another prescribed controlled substance for a new reason, I will tell my care team within 1 business day.  5. I will use one pharmacy to fill all of my controlled substance prescriptions, and it is up to me to make sure that I do not run out of my medicines on weekends or holidays. If my care team is willing to refill my controlled substance prescription without a visit, I must request refills only during office hours, refills may take up to 3 days to process, and it may take up to 5 to 7 days for my medicine to be mailed and ready at my pharmacy. Prescriptions will not be mailed anywhere except my pharmacy.    6. I am responsible for my prescriptions. If the medicine/prescription is lost or stolen, it will not be replaced. I also agree not to share controlled substance medicines with anyone.          [facility]  07/24/19  Patient:  Angelic Damon  YOB: 1970  Medical Record Number: [id number]  CSN: [id number]    7. I agree to not use ANY illegal or recreational drugs. This includes marijuana, cocaine, bath salts or other drugs. I agree not to use alcohol unless my care team says I may. I agree to give urine samples whenever asked. If I dont give a urine sample, the care team may stop my medicine.    8. If I enroll in the [place] Medical Marijuana program, I will tell my care team. I will also sign an agreement to share my medical records with my care team.    9. I will bring in my list of medicines (or my medicine bottles) each time I come to the clinic.   10. I will tell my care team right away if I become pregnant or have a new medical problem treated outside of my regular clinic.  11. I understand that this medicine can affect my  thinking and judgment. It may be unsafe for me to drive, use machinery and do dangerous tasks. I will not do any of these things until I know how the medicine affects me. If my dose changes, I will wait to see how it affects me. I will contact my care team if I have concerns about medicine side effects.    I understand that if I do not follow any of the conditions above, my prescriptions or treatment may be stopped.      I agree that my provider, clinic care team, and pharmacy may work with any city, state or federal law enforcement agency that investigates the misuse, sale, or other diversion of my controlled medicine. I will allow my provider to discuss my care with or share a copy of this agreement with any other treating provider, pharmacy or emergency room where I receive care. I agree to give up (waive) any right of privacy or confidentiality with respect to these consents.   I have read this agreement and have asked questions about anything I did not understand.    ___________________________________________________________________________  Patient signature - Date/Time  -Angelic Damon                                      ___________________________________________________________________________  Witness signature                                                                    ___________________________________________________________________________  Provider signature- Toma Justin MD

## 2021-06-19 NOTE — LETTER
Letter by Toma Justin MD at      Author: Toma Justin MD Service: -- Author Type: --    Filed:  Encounter Date: 5/15/2019 Status: (Other)         Angelic CHAZ Damon   [facility]   [facility] [address]      May 15, 2019      Dear Angelic Damon,   : 1970      This letter is in regards to the appointment that you had scheduled on 19 at the [facility] with Dr. Toma Justin.     The [facility] strives to see all patients in a timely manner and we need your help to achieve this.  The above-mentioned appointment was missed and we do not have record of a cancellation by you.  Whenever possible, we request appointment cancellations at least 24 hours in advance.  This time allows us to offer the appointment to another patient in need.      If you feel you have received this letter in error, or if you need to reschedule this appointment, please call our office so that we may update our records.      Sincerely,    [facility]

## 2021-06-19 NOTE — PROGRESS NOTES
Assessment:      Healthy female exam.    Depression - stable  Insomnia  Tobacco use  Carpal tunnel     Plan:       All questions answered.  Await pap smear results.   Tramadol for pain in the hand - will get records from neurology and review.  Controlled substance contract signed and understood.  Trazodone called in for insomnia  Continue to monitor symtpoms of bleeding and if any further episodes call the office and plan for referral to GYN for EMB.  nicoderm for smoking cessation  Reviewed PHQ9 and will continue to monitor.  Pt would not like to be on medication  Subjective:      Angelic Damon is a 48 y.o. female who presents for an annual exam. The patient is sexually active. The patient participates in regular exercise: yes. The patient reports that there is not domestic violence in her life.   Problems with sleeping.  Previously on trazodone and worked well.  Waking up 5-6x/night.  Taking OTC and not working.     Hx of carpal tunnel and seen by neurology. Pain in the left and and starting to go into the right hand.  Seen by orthopedics in the past and had injection done and no improvement.  Wearing wrist splints occasionally at night but don't seem to help.  Taking tramadol currently once a day and helping with the pain.  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  Mostly numbness and pain in the hands.     Early menopause at age 38.  States episode of bleeding vaginally about 3 years ago and EMB done and found to be normal but no cause for the bleeding that the pt knew of.  States a few days ago a very small spotting episode occurred.  When wiping - unsure if vaginally or rectally caused.  Due for pap.      Smoking about 1 PPD and would like to stop.  Previously on patches and worked well.     Hx of depression but stable at this time.    Healthy Habits:   Regular Exercise: Yes  Sunscreen Use: Yes  Healthy Diet: Yes  Dental Visits Regularly: Yes  Seat Belt: Yes  Sexually active:  Yes  Self Breast Exam Monthly:No  Hemoccults: No  Flex Sig: No  Colonoscopy: No  Lipid Profile: No  Glucose Screen: No  Prevention of Osteoporosis: No  Last Dexa: No  Guns at Home:  No        There is no immunization history on file for this patient.  Immunization status: up to date and documented.    No exam data present    Gynecologic History  No LMP recorded. Patient is postmenopausal.  Contraception: post menopausal status  Last Pap: . Results were: normal  Last mammogram: . Results were: normal      OB History    Para Term  AB Living     0      SAB TAB Ectopic Multiple Live Births                    Current Outpatient Prescriptions   Medication Sig Dispense Refill     traZODone (DESYREL) 100 MG tablet Take 1 tablet (100 mg total) by mouth at bedtime. 30 tablet 2     calcium carb/vit D3/minerals (CALCIUM CARBONATE-VIT D3-MIN) 600 mg (1,500 mg)-200 unit Chew Chew 1 tablet 2 (two) times a day. 180 tablet 0     nicotine (NICODERM CQ) 21 mg/24 hr Place 1 patch on the skin daily. 30 patch 1     simethicone (GAS-X) 80 MG chewable tablet Chew 1 tablet (80 mg total) 4 (four) times a day as needed for flatulence. 100 tablet 2     traMADol (ULTRAM) 50 mg tablet Take 1 tablet (50 mg total) by mouth daily. 30 tablet 0     No current facility-administered medications for this visit.      Past Medical History:   Diagnosis Date     Alcoholism (H)      History reviewed. No pertinent surgical history.  Review of patient's allergies indicates no known allergies.  Family History   Problem Relation Age of Onset     Alcoholism Father      Prostate cancer Father      Alcoholism Mother      Aneurysm Mother      aortic      Heart disease Mother      Social History     Social History     Marital status: Single     Spouse name: N/A     Number of children: 0     Years of education: N/A     Occupational History     massage therapist  [employer]     Social History Main Topics     Smoking status: Current Some Day  "Smoker     Packs/day: 1.00     Years: 12.00     Smokeless tobacco: Never Used     Alcohol use 4.0 oz/week     8 Standard drinks or equivalent per week      Comment: 4 drinks/drinking episode. Usually wine or beer      Drug use: No     Sexual activity: Yes     Partners: Male     Other Topics Concern     Not on file     Social History Narrative       Review of Systems  Review of Systems      A complete 10 point review of systems was obtained and is negative other than what is stated in the HPI.     Objective:         Vitals:    07/25/18 1023   BP: 134/86   Pulse: 68   Weight: 133 lb (60.3 kg)   Height: 5' 5.5\" (1.664 m)     Body mass index is 21.8 kg/(m^2).    Physical  Physical Exam       Physical Exam:  General Appearance: Alert, cooperative, no distress, appears stated age   Head: Normocephalic, without obvious abnormality, atraumatic  Eyes: PERRL, conjunctiva/corneas clear, EOM's intact   Ears: Normal TM's and external ear canals, both ears  Nose:Nares normal, septum midline,mucosa normal, no drainage    Throat:Lips, mucosa, and tongue normal; teeth and gums normal  Neck: Supple, symmetrical, trachea midline, no adenopathy;  thyroid: not enlarged, symmetric, no tenderness/mass/nodules  Back: Symmetric, no curvature, ROM normal,  Lungs: Clear to auscultation bilaterally, respirations unlabored  Heart: Regular rate and rhythm, S1 and S2 normal, no murmur, rub, or gallop  Abdomen: Soft, non-tender, bowel sounds active all four quadrants,  no masses, no organomegaly  Extremities: Extremities normal, atraumatic, no cyanosis or edema  Skin: Skin color, texture, turgor normal, no rashes or lesions  Lymph nodes: Cervical, supraclavicular, and axillary nodes normal  Neurologic: Normal  :  Normal cervix, no CMT, no masses appreciated, pap done      "

## 2021-06-20 NOTE — PROGRESS NOTES
Family Medicine Office Visit  [facility]  Patient Name: Angelic Damon  Patient Age: 48 y.o.  YOB: 1970  MRN: [id number]    Date of Visit: 2018  Reason for Office Visit:   Chief Complaint   Patient presents with     Follow-up     Medication check - High BP -            Assessment / Plan / Medical Decision Makin. Carpal tunnel syndrome of left wrist  Will do referral to ortho and see if they can allieviate any symptoms.  Called in refill of tramadol.  Current treatment plan:  200 mg of tramadol, one times per day (30 pills per day)  Reviewed goals of care.  Benefits continue to outweigh the risks of continued use of opioid analgesia.  Prescription monitoring database has been reviewed and shows no aberrant use.  PEG Pain Screening Tool completed and reviewed and patient continues to demonstrate functional improvement as a result of opioid analgesia.  Reviewed side effects from current therapy.  Plan to follow-up in 1 months  Number of refills allowed prior to follow-up visit: 0    - Ambulatory referral to Orthopedic Surgery    2. Tobacco use  Discussed smoking cessation and quit date next thursday    3. Essential hypertension, benign  Added lisinopril to the regime and continue to check bp.  Call next week with pressures.        Health Maintenance Review  Health Maintenance   Topic Date Due     DEPRESSION FOLLOW UP  1970     INFLUENZA VACCINE RULE BASED (1) 2018     MAMMOGRAM  2019     TD 18+ HE  2022     ADVANCE DIRECTIVES DISCUSSED WITH PATIENT  2022     PAP SMEAR  2023     TDAP ADULT ONE TIME DOSE  Addressed         I have discontinued Ms. Damon's traMADol. I am also having her start on traMADol and lisinopril. Additionally, I am having her maintain her traZODone, nicotine, simethicone, calcium-vitamin D, and propranolol.      HPI:  Angelic Damon is a 48 y.o. year old who presents to the office today for some  elevated bp.  Went to the dentist and told unable to have cleaning due to the bp.  Seen by another provider who started her on propanolol.  bp at home running 140-150/80s.  No BENITO to the medication.  No shortness of breath or chest pain.      Left hand pain.  Previously seen by orthopedics and neurology for this.  Started on gabapentin but no improvement.  Works as a [employer].  Pain is getting worse.  Initially started in first 2 fingers and thumb and now moved onto all fingers.  EMG done showed mild carpal tunnel and previously had injection for this without improvement. On tramadol for the pain as per last provider.        Review of Systems- pertinent positive in bold:  Constitutional: Fever, chills, night sweats, fainting, weight change, fatigue, seizures, dizziness, sleeping difficulties, loud snoring/pauses in breathing  Eyes: change in vision, blurred or double vision, redness/eye pain  Ears, nose, mouth, throat: change in hearing, ear pain, hoarseness, difficulty swallowing, sores in the mouth or throat  Respiratory: shortness of breath, cough, bloody sputum, wheezing  Cardiovascular: chest pain, palpitations   Gastrointestinal: abdominal pain, heartburn/indigestion, nausea/vomiting, change in appetite, change in bowel habits, constipation or diarrhea, rectal bleeding/dark stools, difficulty swallowing  Urinary: painful urination, frequent urination, urinary urgency/incontinence, blood in urine/dark urine, nocturia  Musculoskeletal: backache/back pain (new or increasing), weakness, joint pain/stiffness (new or increasing), muscle cramps, swelling of hands, feet, ankles, leg pain/redness  Skin: change in moles/freckles, rash, nodules  Hematologic/lymphatic: swollen lymph glands, abnormal bruising/bleeding  Endocrine: excessive thirst/urination, cold or heat intolerance  Neurologic/emotional: worrisome memory change, numbness/tingling, anxiety, mood swings      Current Scheduled Meds:  Outpatient Encounter  "Prescriptions as of 9/20/2018   Medication Sig Dispense Refill     calcium-vitamin D 500 mg(1,250mg) -200 unit per tablet Take 1 tablet by mouth 2 (two) times a day with meals. 180 tablet 0     nicotine (NICODERM CQ) 21 mg/24 hr Place 1 patch on the skin daily. 30 patch 1     propranolol (INDERAL) 40 MG tablet Take 1 tablet (40 mg total) by mouth 2 (two) times a day. 60 tablet 0     simethicone (GAS-X) 80 MG chewable tablet Chew 1 tablet (80 mg total) 4 (four) times a day as needed for flatulence. 100 tablet 2     traZODone (DESYREL) 100 MG tablet Take 1 tablet (100 mg total) by mouth at bedtime. 30 tablet 2     [DISCONTINUED] traMADol (ULTRAM) 50 mg tablet Take 1 tablet (50 mg total) by mouth daily. 30 tablet 0     lisinopril (PRINIVIL,ZESTRIL) 10 MG tablet Take 1 tablet (10 mg total) by mouth daily. 30 tablet 11     traMADol (ULTRAM-ER) 200 MG 24 hr tablet Take 1 tablet (200 mg total) by mouth daily. 30 tablet 0     No facility-administered encounter medications on file as of 9/20/2018.      Past Medical History:   Diagnosis Date     Alcoholism (H)      Anxiety      Arthritis      Depression      Postmenopausal      No past surgical history on file.  Social History   Substance Use Topics     Smoking status: Current Some Day Smoker     Packs/day: 1.00     Years: 12.00     Smokeless tobacco: Never Used     Alcohol use 4.0 oz/week     8 Standard drinks or equivalent per week      Comment: 4 drinks/drinking episode. Usually wine or beer        Objective / Physical Examination:  Vitals:    09/20/18 1143   BP: (!) 156/98   Pulse: (!) 59   Resp: 16   Temp: 97.7  F (36.5  C)   TempSrc: Oral   SpO2: 99%   Weight: 139 lb 1.6 oz (63.1 kg)   Height: 5' 6\" (1.676 m)     Wt Readings from Last 3 Encounters:   09/20/18 139 lb 1.6 oz (63.1 kg)   09/17/18 134 lb 12 oz (61.1 kg)   07/25/18 133 lb (60.3 kg)     BP Readings from Last 3 Encounters:   09/20/18 (!) 156/98   09/17/18 (!) 148/98   07/25/18 134/86     Body mass index is " 22.45 kg/(m^2).     General Appearance: Alert and oriented, cooperative, affect appropriate, speech clear, in no apparent distress  Lungs: Clear to auscultation bilaterally. Normal inspiratory and expiratory effort  Cardiovascular: Regular rate, normal S1, S2. No murmurs, rubs, or gallops  Abdomen: Bowel sounds active all four quadrants. Soft, non-tender. No hepatomegaly or splenomegaly. No bruits detected.   Extremities: Pulses 2+ and equal throughout. No edema. Strength equal throughout Pain and decreased sensation in the left hand in all fingers.  Integumentary: Warm and dry. Without suspicious looking lesions  Neuro: Alert and oriented, follows commands appropriately.     Orders Placed This Encounter   Procedures     Ambulatory referral to Orthopedic Surgery   Followup: No Follow-up on file. earlier if needed.    Total time spent with patient was 25 min with >50% of time spent in face-to-face counseling regarding the above plan       Toma Justin MD

## 2021-06-20 NOTE — PROGRESS NOTES
Chief Complaint   Patient presents with     Blood Pressure Check     Dental office was elevated need clearance          HPI:   Angelic Damon is a 48 y.o. female in for follow up of elevated BP at her dental office last week.  Brings readings running 160-170/mid 110's from the dentist.  She has taken her BP at home since that time with readings in the low 140's/mid 80's.  She does not have a history of HTN.  She denies shortness of breath, chest pain, leg swelling.  She does state that she has been anxious regarding the dental work.    She does smoke and has plans to quit using the nicotine patch    ROS:  A 10 point comprehensive review of systems was negative except as noted.     Medications:  Current Outpatient Prescriptions on File Prior to Visit   Medication Sig Dispense Refill     simethicone (GAS-X) 80 MG chewable tablet Chew 1 tablet (80 mg total) 4 (four) times a day as needed for flatulence. 100 tablet 2     traMADol (ULTRAM) 50 mg tablet Take 1 tablet (50 mg total) by mouth daily. 30 tablet 0     traZODone (DESYREL) 100 MG tablet Take 1 tablet (100 mg total) by mouth at bedtime. 30 tablet 2     calcium-vitamin D 500 mg(1,250mg) -200 unit per tablet Take 1 tablet by mouth 2 (two) times a day with meals. 180 tablet 0     nicotine (NICODERM CQ) 21 mg/24 hr Place 1 patch on the skin daily. 30 patch 1     No current facility-administered medications on file prior to visit.          Social History:  Social History   Substance Use Topics     Smoking status: Current Some Day Smoker     Packs/day: 1.00     Years: 12.00     Smokeless tobacco: Never Used     Alcohol use 4.0 oz/week     8 Standard drinks or equivalent per week      Comment: 4 drinks/drinking episode. Usually wine or beer          Physical Exam:   Vitals:    09/17/18 1335   BP: (!) 152/98   Patient Site: Right Arm   Patient Position: Sitting   Cuff Size: Adult Regular   Pulse: 80   Resp: 16   Temp: 98.9  F (37.2  C)   TempSrc: Oral   Weight: 134 lb  "12 oz (61.1 kg)   Height: 5' 5.5\" (1.664 m)       GENERAL:   Alert. Oriented.  EYES: Clear  HENT:  Ears: R TM pearly gray. Normal landmarks. L TM pearly gray.  Normal landmarks  Nose: Clear.  Sinuses: Nontender.  Oropharynx:  No erythema. No exudate.  NECK: Supple. No adenopathy.  LUNGS: Clear to ascultation.  No crackles.  No wheezing  HEART: RRR  SKIN:  No rash.         Assessment/Plan:    1. Elevated BP without diagnosis of hypertension  propranolol (INDERAL) 40 MG tablet      Discussed using propranolol through tomorrow for her dental appointment.  She has a visit with her primary physician the day after that and can further discuss the need for BP treatment.          Leopoldo Marquez MD      9/17/2018    The following portions of the patient's history were reviewed and updated as appropriate: allergies, current medications, past family history, past medical history, past social history, past surgical history and problem list.      "

## 2021-06-21 NOTE — LETTER
Letter by Toma Justin MD at      Author: Toma Justin MD Service: -- Author Type: --    Filed:  Encounter Date: 5/17/2021 Status: (Other)         Angelic CHAZ Damon  [address]  [address]  [place] [address]    May 17, 2021    Dear Angelic    In reviewing your records, we have determined a gap in your preventive services. Based on your age and health history, we recommend the follow service.     ? Med check    If you have had the service elsewhere, please contact us so we can update our records. Please let us know if you have transferred your care to another clinic.    Please call [phone] to schedule this appointment.    We believe that a strong preventive care program, including regular physicals and follow-up care is an important part of a healthy lifestyle and we are committed to helping you maintain your health.    Thank you for choosing us as your health care provider.    Sincerely,   [address], [address]  [facility] [address]  Phone Number:  [phone]

## 2021-06-23 NOTE — PROGRESS NOTES
Family Medicine Office Visit  [facility]  Patient Name: Angelic Damon  Patient Age: 48 y.o.  YOB: 1970  MRN: [id number]    Date of Visit: 2019  Reason for Office Visit:   Chief Complaint   Patient presents with     Medication Education Visit     Depression     Referral     Carpal tunnel           Assessment / Plan / Medical Decision Makin. Carpal tunnel syndrome of left wrist  Given splint to wear at night and sent to scheduling to reschedule appointment with [facility].  Tramadol called in for the pain.  Current treatment plan:  200 mg of tramadol, 1 times per day (1 pills per day)  Reviewed goals of care.  Benefits continue to outweigh the risks of continued use of opioid analgesia.  Prescription monitoring database has been reviewed and shows no aberrant use.  PEG Pain Screening Tool completed and reviewed and patient continues to demonstrate functional improvement as a result of opioid analgesia.  Reviewed side effects from current therapy.  Plan to follow-up in 1 months  Number of refills allowed prior to follow-up visit: 1    - Wrist/Arm DME: Wrist Brace; Left; non-thumb spica    2. Severe episode of recurrent major depressive disorder, without psychotic features (H)  Start zoloft and discussed therapy.  Pt would like to call insurance and find her own therapist    3. Diarrhea, unspecified type  - Ambulatory referral to Gastroenterology    4. Insomnia, unspecified type  Refill trazodone    5. Essential hypertension, benign  Stable and will continue to monitor.  Not on medication any longer    6. Bloating symptom  - Ambulatory referral to Gastroenterology        Health Maintenance Review  Health Maintenance   Topic Date Due     DEPRESSION FOLLOW UP  1970     INFLUENZA VACCINE RULE BASED (1) 2018     MAMMOGRAM  2019     ADVANCE DIRECTIVES DISCUSSED WITH PATIENT  2022     TD 18+ HE  2023     PAP SMEAR   07/25/2023     TDAP ADULT ONE TIME DOSE  Addressed         I have discontinued Alison Damon's simethicone, lisinopril, and propranolol. I am also having her start on sertraline. Additionally, I am having her maintain her calcium-vitamin D, nicotine, traZODone, and traMADol.      HPI:  Angelic Damon is a 48 y.o. year old who presents to the office today for multiple complaints.  Still having problems with left hand carpal tunnel.  Seen by neurology in the past and wearing splints at night but no longer working.  Given referral last visit to orthopedics but pt states never made the appointment.  Numbness in the left thumb and first 2 fingers constant and burning pain in the whole hand.  No swelling or erythema     Diarrhea and bloating off and on.  Taking metamucil and helping some.  Not feeling like she is eating as well as she should.  Gas-x not helping.  Blood occasionally in the stool and unsure if related to hemorrhoid.  No pain in the abdomen or with BM.  Occasional dark stools but no black stools seen.  Diarrhea ongoing for over 6 months.  No fevers or chills.    Problems with depression - mood low.  Feels like difficult getting up to go to work, apathetic, no thoughts of harming self or others.  Previously on wellburtrin and unable to tolerate.  Tried cymbalta and lexapro maybe and that did not work.  Not sleeping well.  Taking trazodone and falling asleep but only for a few hours at a time.  Previously seen by therapy but no longer.        Review of Systems- pertinent positive in bold:  Constitutional: Fever, chills, night sweats, fainting, weight change, fatigue, seizures, dizziness, sleeping difficulties, loud snoring/pauses in breathing  Eyes: change in vision, blurred or double vision, redness/eye pain  Ears, nose, mouth, throat: change in hearing, ear pain, hoarseness, difficulty swallowing, sores in the mouth or throat  Respiratory: shortness of breath, cough, bloody sputum, wheezing  Cardiovascular:  chest pain, palpitations   Gastrointestinal: abdominal pain, heartburn/indigestion, nausea/vomiting, change in appetite, change in bowel habits, constipation or diarrhea, rectal bleeding/dark stools, difficulty swallowing  Urinary: painful urination, frequent urination, urinary urgency/incontinence, blood in urine/dark urine, nocturia  Musculoskeletal: backache/back pain (new or increasing), weakness, joint pain/stiffness (new or increasing), muscle cramps, swelling of hands, feet, ankles, leg pain/redness  Skin: change in moles/freckles, rash, nodules  Hematologic/lymphatic: swollen lymph glands, abnormal bruising/bleeding  Endocrine: excessive thirst/urination, cold or heat intolerance  Neurologic/emotional: worrisome memory change, numbness/tingling, anxiety, mood swings      Current Scheduled Meds:  Outpatient Encounter Medications as of 1/28/2019   Medication Sig Dispense Refill     traZODone (DESYREL) 100 MG tablet Take 1 tablet (100 mg total) by mouth at bedtime. 30 tablet 11     calcium-vitamin D 500 mg(1,250mg) -200 unit per tablet Take 1 tablet by mouth 2 (two) times a day with meals. 180 tablet 0     nicotine (NICODERM CQ) 21 mg/24 hr Place 1 patch on the skin daily. 30 patch 11     sertraline (ZOLOFT) 50 MG tablet Take 1 tablet (50 mg total) by mouth daily. 30 tablet 2     traMADol (ULTRAM-ER) 200 MG 24 hr tablet Take 1 tablet (200 mg total) by mouth daily. 30 tablet 0     [DISCONTINUED] lisinopril (PRINIVIL,ZESTRIL) 10 MG tablet Take 1 tablet (10 mg total) by mouth daily. 30 tablet 11     [DISCONTINUED] propranolol (INDERAL) 40 MG tablet Take 1 tablet (40 mg total) by mouth 2 (two) times a day. 60 tablet 11     [DISCONTINUED] simethicone (GAS-X) 80 MG chewable tablet Chew 1 tablet (80 mg total) 4 (four) times a day as needed for flatulence. 100 tablet 2     [DISCONTINUED] traMADol (ULTRAM-ER) 200 MG 24 hr tablet Take 1 tablet (200 mg total) by mouth daily. 30 tablet 0     No facility-administered  "encounter medications on file as of 1/28/2019.      Past Medical History:   Diagnosis Date     Alcoholism (H)      Anxiety      Arthritis      Depression      Postmenopausal      No past surgical history on file.  Social History     Tobacco Use     Smoking status: Current Some Day Smoker     Packs/day: 1.00     Years: 12.00     Pack years: 12.00     Smokeless tobacco: Never Used   Substance Use Topics     Alcohol use: Yes     Alcohol/week: 4.0 oz     Types: 8 Standard drinks or equivalent per week     Comment: 4 drinks/drinking episode. Usually wine or beer      Drug use: No       Objective / Physical Examination:  Vitals:    01/28/19 1048   BP: 138/80   Pulse: 92   Resp: 16   Temp: 98.7  F (37.1  C)   TempSrc: Oral   Weight: 138 lb 2 oz (62.7 kg)   Height: 5' 6\" (1.676 m)     Wt Readings from Last 3 Encounters:   01/28/19 138 lb 2 oz (62.7 kg)   11/11/18 128 lb (58.1 kg)   09/20/18 139 lb 1.6 oz (63.1 kg)     BP Readings from Last 3 Encounters:   01/28/19 138/80   11/12/18 (!) 156/99   09/20/18 (!) 156/98     Body mass index is 22.29 kg/m .   Results for orders placed or performed during the hospital encounter of 11/11/18   HM2 (CBC W/O DIFF)   Result Value Ref Range    WBC 6.9 4.0 - 11.0 thou/uL    RBC 4.00 3.80 - 5.40 mill/uL    Hemoglobin 13.3 12.0 - 16.0 g/dL    Hematocrit 39.3 35.0 - 47.0 %    MCV 98 80 - 100 fL    MCH 33.3 27.0 - 34.0 pg    MCHC 33.8 32.0 - 36.0 g/dL    RDW 12.3 11.0 - 14.5 %    Platelets 168 140 - 440 thou/uL    MPV 10.3 8.5 - 12.5 fL   Basic Metabolic Panel   Result Value Ref Range    Sodium 143 136 - 145 mmol/L    Potassium 4.0 3.5 - 5.0 mmol/L    Chloride 108 (H) 98 - 107 mmol/L    CO2 25 22 - 31 mmol/L    Anion Gap, Calculation 10 5 - 18 mmol/L    Glucose 79 70 - 125 mg/dL    Calcium 9.5 8.5 - 10.5 mg/dL    BUN 7 (L) 8 - 22 mg/dL    Creatinine 0.69 0.60 - 1.10 mg/dL    GFR MDRD Af Amer >60 >60 mL/min/1.73m2    GFR MDRD Non Af Amer >60 >60 mL/min/1.73m2   Lipase   Result Value Ref Range "    Lipase 34 0 - 52 U/L   Hepatic Profile   Result Value Ref Range    Bilirubin, Total 0.2 0.0 - 1.0 mg/dL    Bilirubin, Direct <0.1 <=0.5 mg/dL    Protein, Total 7.3 6.0 - 8.0 g/dL    Albumin 4.1 3.5 - 5.0 g/dL    Alkaline Phosphatase 72 45 - 120 U/L    AST 30 0 - 40 U/L    ALT 26 0 - 45 U/L   Troponin I   Result Value Ref Range    Troponin I <0.01 0.00 - 0.29 ng/mL   Troponin I   Result Value Ref Range    Troponin I <0.01 0.00 - 0.29 ng/mL   ECG 12 lead nursing unit performed   Result Value Ref Range    SYSTOLIC BLOOD PRESSURE  mmHg    DIASTOLIC BLOOD PRESSURE  mmHg    VENTRICULAR RATE 78 BPM    ATRIAL RATE 78 BPM    P-R INTERVAL 150 ms    QRS DURATION 86 ms    Q-T INTERVAL 398 ms    QTC CALCULATION (BEZET) 453 ms    P Axis 70 degrees    R AXIS 49 degrees    T AXIS 43 degrees    MUSE DIAGNOSIS       Normal sinus rhythm  Possible Anterior infarct , age undetermined  Abnormal ECG  No previous ECGs available  Confirmed by RAJ EDUARDO MD LOC:JN ([id number]) on 11/12/2018 4:58:09 PM     ECG 12 lead nursing unit performed   Result Value Ref Range    SYSTOLIC BLOOD PRESSURE  mmHg    DIASTOLIC BLOOD PRESSURE  mmHg    VENTRICULAR RATE 59 BPM    ATRIAL RATE 59 BPM    P-R INTERVAL 164 ms    QRS DURATION 90 ms    Q-T INTERVAL 450 ms    QTC CALCULATION (BEZET) 445 ms    P Axis 68 degrees    R AXIS 53 degrees    T AXIS 48 degrees    MUSE DIAGNOSIS       Sinus bradycardia with sinus arrhythmia  Otherwise normal ECG  When compared with ECG of 11-NOV-2018 22:47,  No significant change was found  Confirmed by RAJ EDUARDO MD LOC:JN ([id number]) on 11/12/2018 4:59:20 PM             General Appearance: Alert and oriented, cooperative, affect appropriate, speech clear, in no apparent distress  Head: Normocephalic, atraumatic  Ears: Tympanic membrane clear with landmarks well visualized bilaterally  Eyes: PERRL, fundi appear clear bilaterally. EOMI. Conjunctivae clear and sclerae non-icteric  Nose: Septum midline, nares patent, no  visible polyps, mucosa moist and without drainage  Throat: Lips and mucosa moist. Teeth in good repair, pharynx without erythema or exudate  Neck: Supple, trachea midline. No cervical adenopathy  Back: Symmetrical and nontender  Lungs: Clear to auscultation bilaterally. Normal inspiratory and expiratory effort  Cardiovascular: Regular rate, normal S1, S2. No murmurs, rubs, or gallops  Abdomen: Bowel sounds active all four quadrants. Soft, non-tender. No hepatomegaly or splenomegaly. No bruits detected.   Extremities: Pulses 2+ and equal throughout. No edema. Strength equal throughout.  Integumentary: Warm and dry. Without suspicious looking lesions  Neuro: Alert and oriented, follows commands appropriately.   Patient presented alert and oriented x3.  Well-groomed.  Attire was appropriate.  Patient was cooperative.  Patient motor actively was normal and eye contact appropriate.  Patients mood was anxious and affect congruent.  Patient s speech and language was normal.  Patient attention and thought process normal.  Concentration was appropriate.  Patient denied delusions or hallucinations. Patient denied suicidal or homicidal ideation.  Patient denied any long or short term memory problems. No evidence of impairment in judgment.   Patient has insight and fund of knowledge was adequate.       Orders Placed This Encounter   Procedures     Wrist/Arm DME: Wrist Brace; Left; non-thumb spica     Ambulatory referral to Gastroenterology   Followup: Return in about 4 weeks (around 2/25/2019) for Recheck. earlier if needed.    Total time spent with patient was 40 min with >50% of time spent in face-to-face counseling regarding the above plan       Toma Justin MD

## 2021-06-25 NOTE — TELEPHONE ENCOUNTER
"Attempted to contact patient, number \"temporarily out of service.\"    Ke Alarcon RN  [facility].  "

## 2021-06-25 NOTE — TELEPHONE ENCOUNTER
"Attempted to contact, message still stating \"number is temporarily out of service.\"    Will send letter with PCP message and close encounter.    Ke Alarcon RN  [facility]          "

## 2021-06-25 NOTE — TELEPHONE ENCOUNTER
Attempted to call number listed, however number states it is temporarily out of service. Will try again later  Adán Seals CMA

## 2021-06-25 NOTE — TELEPHONE ENCOUNTER
RN cannot approve Refill Request    RN can NOT refill this medication PCP messaged that patient is overdue for Office Visit. Last office visit: 7/24/2019 Toma Justin MD Last Physical: 7/25/2018 Last MTM visit: Visit date not found Last visit same specialty: 7/24/2019 Toma Justin MD.  Next visit within 3 mo: Visit date not found  Next physical within 3 mo: Visit date not found      Heather Araiza, [facility] Triage/Med Refill 6/13/2021    Requested Prescriptions   Pending Prescriptions Disp Refills     sertraline (ZOLOFT) 100 MG tablet [Pharmacy Med Name: Sertraline HCl Oral Tablet 100 MG] 30 tablet 0     Sig: TAKE 1 TABLET (100 MG TOTAL) BY MOUTH DAILY.       SSRI Refill Protocol  Failed - 6/13/2021  4:29 PM        Failed - PCP or prescribing provider visit in last year     Last office visit with prescriber/PCP: 7/24/2019 Toma Justin MD OR same dept: Visit date not found OR same specialty: 7/24/2019 Toma Justin MD  Last physical: 7/25/2018 Last MTM visit: Visit date not found   Next visit within 3 mo: Visit date not found  Next physical within 3 mo: Visit date not found  Prescriber OR PCP: Toma Justin MD  Last diagnosis associated with med order: 1. Moderate single current episode of major depressive disorder (H)  - sertraline (ZOLOFT) 100 MG tablet [Pharmacy Med Name: Sertraline HCl Oral Tablet 100 MG]; Take 1 tablet (100 mg total) by mouth daily.  Dispense: 30 tablet; Refill: 0    If protocol passes may refill for 12 months if within 3 months of last provider visit (or a total of 15 months).

## 2021-06-25 NOTE — TELEPHONE ENCOUNTER
Refill Request  Did you contact pharmacy: No  Medication name:   Requested Prescriptions     Pending Prescriptions Disp Refills     sertraline (ZOLOFT) 100 MG tablet 30 tablet 6     Sig: Take 1 tablet (100 mg total) by mouth daily.     Who prescribed the medication: Dr. Justin  Requested [facility]acy: [facility]  Is patient out of medication: Yes  Patient notified refills processed in 3 business days:  yes  Okay to leave a detailed message: yes    Dr. Justin    Patient is completely out of this medication and she doesn't have insurance to make an appt.  She is wondering if you will refill this for her.

## 2021-06-25 NOTE — TELEPHONE ENCOUNTER
Still recommend a check in and we can no charge if needed.  Can you set up a telephone visit and put it in the notes for us?  I sent in medication for her

## 2021-07-04 NOTE — LETTER
Letter by Toma Justin MD at      Author: Toma Justin MD Service: -- Author Type: --    Filed:  Encounter Date: 6/14/2021 Status: (Other)         Angelic Damon  [address] [place] [address] [address]  [place] [address]               June 16, 2021      Dear Angelic:    Dr. Justin filled your sertraline for you.  She also expressed she would like to have a telephone visit with you and not charge for it given you insurance concern.  Your health and follow-up medical care are important to us. Please call our office as soon as possible so that we may reschedule your appointment. If you have already rescheduled your appointment, please disregard this letter.    Sincerely,        Toma Justin MD

## 2021-07-04 NOTE — LETTER
Letter by Toma Justin MD at      Author: Toma Justin MD Service: -- Author Type: --    Filed:  Encounter Date: 6/14/2021 Status: (Other)         Angelic Damon  [address] [place] [address] [address]  [place] [address]      06/14/21      Dear Aneglic,     We received a refill request from your pharmacy on your sertraline medication. This refill request has been denied. Dr. Justin stated you will need to make an appointment to follow up on medication. Once the appointment is made  is willing to send enough medication to get to your appointment.    I did attempt to call you but your phone number is out of service.            Sincerely,  Toma Avila [facility]  [address] [place] [id number]  [phone]

## 2021-08-09 DIAGNOSIS — F32.1 MODERATE SINGLE CURRENT EPISODE OF MAJOR DEPRESSIVE DISORDER (H): ICD-10-CM

## 2021-08-09 NOTE — LETTER
August 12, 2021      Angelic Damon  [address]    [place] [address]        Dear Angelic,      We received another refill request for your sertraline. We have made several attempts to reach you via mail and phone. The phone number we have on file is not in service. Dr. Justin is not willing to fill your medication until you are seen.     Please call us at [phone] to schedule an appointment for a medication check.    If you are no longer coming to our clinic please update your pharmacy and let us know as well.      Sincerely,        Toma Justin MD

## 2021-08-11 RX ORDER — SERTRALINE HYDROCHLORIDE 100 MG/1
100 TABLET, FILM COATED ORAL DAILY
Qty: 30 TABLET | Refills: 1 | OUTPATIENT
Start: 2021-08-11

## 2021-08-11 NOTE — TELEPHONE ENCOUNTER
"Routing refill request to provider for review/approval because:  PHQ 9 score  No flowsheet data found.   Visit greater than one year ago    Last Written Prescription Date:  6/14/21  Last Fill Quantity: 30,  # refills: 1   Last office visit provider:  7/24/19     Requested Prescriptions   Pending Prescriptions Disp Refills     sertraline (ZOLOFT) 100 MG tablet 30 tablet 1     Sig: Take 1 tablet (100 mg) by mouth daily       SSRIs Protocol Failed - 8/9/2021 10:33 AM        Failed - PHQ-9 score less than 5 in past 6 months     Please review last PHQ-9 score.           Failed - Recent (6 mo) or future (30 days) visit within the authorizing provider's specialty     Patient had office visit in the last 6 months or has a visit in the next 30 days with authorizing provider or within the authorizing provider's specialty.  See \"Patient Info\" tab in inbasket, or \"Choose Columns\" in Meds & Orders section of the refill encounter.            Passed - Medication is active on med list        Passed - Patient is age 18 or older        Passed - No active pregnancy on record        Passed - No positive pregnancy test in last 12 months             Zhou Qiu RN 08/11/21 9:42 AM  "

## 2021-11-02 ENCOUNTER — TELEPHONE (OUTPATIENT)
Dept: FAMILY MEDICINE | Facility: CLINIC | Age: 51
End: 2021-11-02

## 2021-11-02 NOTE — TELEPHONE ENCOUNTER
Reason for Call:  Medication or medication refill:    Do you use a [facility]?  Name of the pharmacy and phone number for the current request:      [facility] pharmacy on file    Name of the medication requested:     Sertraline 100 mg tablet    Other request: Patient stating she does not have any insurance at this time, so scheduling an appt at this time would be difficult.  Patient also has tested positive for covid 19 on 10/27/21.  Patient has no medication left at this time.  If medication is going to be denied, please call patient to let he know why.    Can we leave a detailed message on this number? YES    Phone number patient can be reached at: Home number on file [phone]  (home)    Best Time: Any time    Call taken on 11/2/2021 at 2:35 PM by Hernan Carrasco

## 2021-11-03 ENCOUNTER — VIRTUAL VISIT (OUTPATIENT)
Dept: FAMILY MEDICINE | Facility: CLINIC | Age: 51
End: 2021-11-03
Payer: OTHER GOVERNMENT

## 2021-11-03 DIAGNOSIS — F41.9 ANXIETY: ICD-10-CM

## 2021-11-03 DIAGNOSIS — F32.1 MODERATE SINGLE CURRENT EPISODE OF MAJOR DEPRESSIVE DISORDER (H): ICD-10-CM

## 2021-11-03 DIAGNOSIS — Z71.6 ENCOUNTER FOR TOBACCO USE CESSATION COUNSELING: ICD-10-CM

## 2021-11-03 DIAGNOSIS — U07.1 INFECTION DUE TO 2019 NOVEL CORONAVIRUS: ICD-10-CM

## 2021-11-03 DIAGNOSIS — F51.01 PRIMARY INSOMNIA: Primary | ICD-10-CM

## 2021-11-03 DIAGNOSIS — G56.02 CARPAL TUNNEL SYNDROME OF LEFT WRIST: ICD-10-CM

## 2021-11-03 PROCEDURE — 99214 OFFICE O/P EST MOD 30 MIN: CPT | Mod: 95 | Performed by: FAMILY MEDICINE

## 2021-11-03 RX ORDER — NICOTINE 21 MG/24HR
1 PATCH, TRANSDERMAL 24 HOURS TRANSDERMAL EVERY 24 HOURS
Qty: 30 PATCH | Refills: 3 | Status: SHIPPED | OUTPATIENT
Start: 2021-11-03

## 2021-11-03 RX ORDER — TRAZODONE HYDROCHLORIDE 100 MG/1
100 TABLET ORAL AT BEDTIME
Qty: 90 TABLET | Refills: 1 | Status: SHIPPED | OUTPATIENT
Start: 2021-11-03 | End: 2022-05-14

## 2021-11-03 RX ORDER — NICOTINE 21 MG/24HR
1 PATCH, TRANSDERMAL 24 HOURS TRANSDERMAL EVERY 24 HOURS
Qty: 30 PATCH | Refills: 0 | Status: SHIPPED | OUTPATIENT
Start: 2021-11-03

## 2021-11-03 RX ORDER — BUSPIRONE HYDROCHLORIDE 5 MG/1
5 TABLET ORAL 2 TIMES DAILY
Qty: 180 TABLET | Refills: 3 | Status: SHIPPED | OUTPATIENT
Start: 2021-11-03 | End: 2023-09-20

## 2021-11-03 RX ORDER — SERTRALINE HYDROCHLORIDE 100 MG/1
100 TABLET, FILM COATED ORAL DAILY
Qty: 90 TABLET | Refills: 3 | Status: SHIPPED | OUTPATIENT
Start: 2021-11-03 | End: 2022-12-09

## 2021-11-03 RX ORDER — GABAPENTIN 100 MG/1
100 CAPSULE ORAL AT BEDTIME
Qty: 90 CAPSULE | Refills: 0 | Status: SHIPPED | OUTPATIENT
Start: 2021-11-03 | End: 2022-02-03

## 2021-11-03 ASSESSMENT — ANXIETY QUESTIONNAIRES
6. BECOMING EASILY ANNOYED OR IRRITABLE: NEARLY EVERY DAY
1. FEELING NERVOUS, ANXIOUS, OR ON EDGE: NEARLY EVERY DAY
2. NOT BEING ABLE TO STOP OR CONTROL WORRYING: SEVERAL DAYS
5. BEING SO RESTLESS THAT IT IS HARD TO SIT STILL: NOT AT ALL
7. FEELING AFRAID AS IF SOMETHING AWFUL MIGHT HAPPEN: SEVERAL DAYS
GAD7 TOTAL SCORE: 12
IF YOU CHECKED OFF ANY PROBLEMS ON THIS QUESTIONNAIRE, HOW DIFFICULT HAVE THESE PROBLEMS MADE IT FOR YOU TO DO YOUR WORK, TAKE CARE OF THINGS AT HOME, OR GET ALONG WITH OTHER PEOPLE: VERY DIFFICULT
3. WORRYING TOO MUCH ABOUT DIFFERENT THINGS: NEARLY EVERY DAY

## 2021-11-03 ASSESSMENT — PATIENT HEALTH QUESTIONNAIRE - PHQ9
SUM OF ALL RESPONSES TO PHQ QUESTIONS 1-9: 18
5. POOR APPETITE OR OVEREATING: SEVERAL DAYS

## 2021-11-03 NOTE — PROGRESS NOTES
Alison is a 51 year old who is being evaluated via a billable telephone visit.      What phone number would you like to be contacted at? [phone]  How would you like to obtain your AVS? Mail a copy    Assessment & Plan     Moderate single current episode of major depressive disorder (H)  Restart medication at the 50mg and then increase to 100mg a day  - sertraline (ZOLOFT) 100 MG tablet  Dispense: 90 tablet; Refill: 3    Insomnia  Will refill the trazodone    Encounter for tobacco use cessation counseling  Recommend cessation and called in nicoderm  - nicotine (NICODERM CQ) 21 MG/24HR 24 hr patch  Dispense: 30 patch; Refill: 3  - nicotine (NICODERM CQ) 14 MG/24HR 24 hr patch  Dispense: 30 patch; Refill: 0  - nicotine (NICODERM CQ) 7 MG/24HR 24 hr patch  Dispense: 30 patch; Refill: 0    Anxiety  Restart the buspar.    - busPIRone (BUSPAR) 5 MG tablet  Dispense: 180 tablet; Refill: 3    Primary insomnia  - traZODone (DESYREL) 100 MG tablet  Dispense: 90 tablet; Refill: 1    Carpal tunnel syndrome of left wrist  Recommend wearing wrist splint and use of gabapentin at night and see if any improvement  - gabapentin (NEURONTIN) 100 MG capsule  Dispense: 90 capsule; Refill: 0    Infection due to 2019 novel coronavirus  Stable.  Would like J&J once recovered    Tobacco Cessation:   reports that she has been smoking cigarettes. She has a 12.00 pack-year smoking history. She has never used smokeless tobacco.  Tobacco Cessation Action Plan: Pharmacotherapies : Nicotine patch    Depression Screening Follow Up    PHQ 11/3/2021   PHQ-9 Total Score 18   Q9: Thoughts of better off dead/self-harm past 2 weeks Several days     Last PHQ-9 11/3/2021   1.  Little interest or pleasure in doing things 3   2.  Feeling down, depressed, or hopeless 3   3.  Trouble falling or staying asleep, or sleeping too much 2   4.  Feeling tired or having little energy 3   5.  Poor appetite or overeating 1   6.  Feeling bad about yourself 1   7.   Trouble concentrating 3   8.  Moving slowly or restless 1   Q9: Thoughts of better off dead/self-harm past 2 weeks 1   PHQ-9 Total Score 18   Difficulty at work, home, or with people Very difficult       {       Follow Up      Follow Up Actions Taken  Crisis resource information provided in the After Visit Summary    Discussed the following ways the patient can remain in a safe environment:  remove alcohol and remove drugs      Return in about 6 weeks (around 12/15/2021) for Follow up, Routine preventive.    Toma Justin MD  [facility]    Linda Rosas is a 51 year old who presents for the following health issues     Waking up throughout the night - racing thoughts  Smoking 1 PPD  Mood lower and wanting to get back on the zoloft.  Having panic attacks and heart racing.    covid positive last week and in quarantine    Carpal tunnel - massage therapist    HPI     Depression Followup    How are you doing with your depression since your last visit? Worsened since being off medications    Are you having other symptoms that might be associated with depression? Yes:  short tempered- trouble concentrating     Have you had a significant life event?  OTHER: boyfriends ex-wife is  causing some issues     Are you feeling anxious or having panic attacks?   Yes:  couple times a day     Do you have any concerns with your use of alcohol or other drugs? No    Social History     Tobacco Use     Smoking status: Current Some Day Smoker     Packs/day: 1.00     Years: 12.00     Pack years: 12.00     Types: Cigarettes     Smokeless tobacco: Never Used   Substance Use Topics     Alcohol use: Yes     Alcohol/week: 6.7 standard drinks     Comment: Alcoholic Drinks/day: 4 drinks/drinking episode. Usually wine or beer      Drug use: No     No flowsheet data found.  No flowsheet data found.  Last PHQ-9 11/3/2021   1.  Little interest or pleasure in doing things 3   2.  Feeling down, depressed, or hopeless 3   3.   Trouble falling or staying asleep, or sleeping too much 2   4.  Feeling tired or having little energy 3   5.  Poor appetite or overeating 1   6.  Feeling bad about yourself 1   7.  Trouble concentrating 3   8.  Moving slowly or restless 1   Q9: Thoughts of better off dead/self-harm past 2 weeks 1   PHQ-9 Total Score 18   Difficulty at work, home, or with people Very difficult     JC-7  11/3/2021   1. Feeling nervous, anxious, or on edge 3   2. Not being able to stop or control worrying 1   3. Worrying too much about different things 3   4. Trouble relaxing 1   5. Being so restless that it is hard to sit still 0   6. Becoming easily annoyed or irritable 3   7. Feeling afraid, as if something awful might happen 1   JC-7 Total Score 12   If you checked any problems, how difficult have they made it for you to do your work, take care of things at home, or get along with other people? Very difficult       Suicide Assessment Five-step Evaluation and Treatment (SAFE-T)      How many servings of fruits and vegetables do you eat daily?  2-3    On average, how many sweetened beverages do you drink each day (Examples: soda, juice, sweet tea, etc.  Do NOT count diet or artificially sweetened beverages)?   1    How many days per week do you exercise enough to make your heart beat faster? 3 or less    How many minutes a day do you exercise enough to make your heart beat faster? 30 - 60  How many days per week do you miss taking your medication? 1    What makes it hard for you to take your medications?  remembering to take        Review of Systems   Constitutional, HEENT, cardiovascular, pulmonary, gi and gu systems are negative, except as otherwise noted.      Objective           Vitals:  No vitals were obtained today due to virtual visit.    Physical Exam   healthy, alert and no distress  PSYCH: Alert and oriented times 3; coherent speech, normal   rate and volume, able to articulate logical thoughts, able   to abstract reason,  no tangential thoughts, no hallucinations   or delusions  Her affect is normal  RESP: No cough, no audible wheezing, able to talk in full sentences  Remainder of exam unable to be completed due to telephone visits                Phone call duration: 15 minutes

## 2021-11-03 NOTE — PATIENT INSTRUCTIONS
Take 1/2 of the zoloft (sertaline) a day for the next 2 weeks and then can increase to 1 full tablet a day.

## 2021-11-04 ASSESSMENT — ANXIETY QUESTIONNAIRES: GAD7 TOTAL SCORE: 12

## 2021-11-05 NOTE — TELEPHONE ENCOUNTER
Contacted patient and informed Rx has been sent.  She has already picked it up.  No further questions.

## 2022-02-01 DIAGNOSIS — G56.02 CARPAL TUNNEL SYNDROME OF LEFT WRIST: ICD-10-CM

## 2022-02-03 RX ORDER — GABAPENTIN 100 MG/1
100 CAPSULE ORAL AT BEDTIME
Qty: 90 CAPSULE | Refills: 3 | Status: SHIPPED | OUTPATIENT
Start: 2022-02-03 | End: 2023-09-20

## 2022-02-03 NOTE — TELEPHONE ENCOUNTER
Routing refill request to provider for review/approval because:  Drug not on the [facility] refill protocol     Last Written Prescription Date:  11/3/21  Last Fill Quantity: 90,  # refills: 0   Last office visit provider: 11/3/21, virtual    Requested Prescriptions   Pending Prescriptions Disp Refills     gabapentin (NEURONTIN) 100 MG capsule 90 capsule 0     Sig: Take 1 capsule (100 mg) by mouth At Bedtime       There is no refill protocol information for this order          Becky Diaz RN 02/03/22 3:23 PM

## 2022-05-12 DIAGNOSIS — F51.01 PRIMARY INSOMNIA: ICD-10-CM

## 2022-05-14 RX ORDER — TRAZODONE HYDROCHLORIDE 100 MG/1
100 TABLET ORAL AT BEDTIME
Qty: 90 TABLET | Refills: 0 | Status: SHIPPED | OUTPATIENT
Start: 2022-05-14

## 2022-05-15 NOTE — TELEPHONE ENCOUNTER
"Last Written Prescription Date:  11/3/21  Last Fill Quantity: 90,  # refills: 1   Last office visit provider:  11/3/21     Requested Prescriptions   Pending Prescriptions Disp Refills     traZODone (DESYREL) 100 MG tablet 90 tablet 1     Sig: Take 1 tablet (100 mg) by mouth At Bedtime       Serotonin Modulators Passed - 5/13/2022  8:54 AM        Passed - Recent (12 mo) or future (30 days) visit within the authorizing provider's specialty     Patient has had an office visit with the authorizing provider or a provider within the authorizing providers department within the previous 12 mos or has a future within next 30 days. See \"Patient Info\" tab in inbasket, or \"Choose Columns\" in Meds & Orders section of the refill encounter.              Passed - Medication is active on med list        Passed - Patient is age 18 or older        Passed - No active pregnancy on record        Passed - No positive pregnancy test in past 12 months             Dai Espinosa RN 05/14/22 7:18 PM  "

## 2022-12-08 DIAGNOSIS — F32.1 MODERATE SINGLE CURRENT EPISODE OF MAJOR DEPRESSIVE DISORDER (H): ICD-10-CM

## 2022-12-09 NOTE — TELEPHONE ENCOUNTER
"Routing refill request to provider for review/approval because:  PHQ 9 score - not on file/out of date.  Patient needs to be seen because it has been more than 1 year since last office visit.    Last Written Prescription Date:  11/3/21  Last Fill Quantity: 90,  # refills: 3   Last office visit provider:  11/3/21     Requested Prescriptions   Pending Prescriptions Disp Refills     sertraline (ZOLOFT) 100 MG tablet 90 tablet 3     Sig: Take 1 tablet (100 mg) by mouth daily       SSRIs Protocol Failed - 12/9/2022  8:04 AM        Failed - PHQ-9 score less than 5 in past 6 months     Please review last PHQ-9 score.           Failed - Recent (6 mo) or future (30 days) visit within the authorizing provider's specialty     Patient had office visit in the last 6 months or has a visit in the next 30 days with authorizing provider or within the authorizing provider's specialty.  See \"Patient Info\" tab in inbasket, or \"Choose Columns\" in Meds & Orders section of the refill encounter.            Passed - Medication is active on med list        Passed - Patient is age 18 or older        Passed - No active pregnancy on record        Passed - No positive pregnancy test in last 12 months             Zhou Qiu RN 12/09/22 8:06 AM  "

## 2022-12-15 RX ORDER — SERTRALINE HYDROCHLORIDE 100 MG/1
100 TABLET, FILM COATED ORAL DAILY
Qty: 30 TABLET | Refills: 0 | Status: SHIPPED | OUTPATIENT
Start: 2022-12-15 | End: 2023-08-01

## 2023-07-26 ENCOUNTER — TELEPHONE (OUTPATIENT)
Dept: FAMILY MEDICINE | Facility: CLINIC | Age: 53
End: 2023-07-26

## 2023-07-26 NOTE — TELEPHONE ENCOUNTER
Patient is overdue for a follow up or physical for further refills of medication. Please assist with scheduling.

## 2023-07-27 NOTE — TELEPHONE ENCOUNTER
I was able to get pt scheduled for her Physical with Nhan Sept 20th. Pt is in need of more of her sertraline (ZOLOFT) 100 MG tablet, she was wondering if she can get those refilled to last her until her appt in Sept. She does not have medical insurance for about another month or so.

## 2023-08-01 DIAGNOSIS — F32.1 MODERATE SINGLE CURRENT EPISODE OF MAJOR DEPRESSIVE DISORDER (H): ICD-10-CM

## 2023-08-01 NOTE — TELEPHONE ENCOUNTER
Received fax from pharmacy requesting refill for Zoloft     Last refill: 12/15/2022  Patient last seen: VV 11/2023    Upcoming appt 9/20/2023

## 2023-08-02 NOTE — TELEPHONE ENCOUNTER
"Routing refill request to provider for review/approval because:  Patient needs to be seen because it has been more than 6 months since last office visit.  PHQ-9 Score was 18 last on 11/3/2021    Last Written Prescription Date:  12/15/2022  Last Fill Quantity: 30,  # refills: 0   Last office visit provider:  11/3/2021     Requested Prescriptions   Pending Prescriptions Disp Refills    sertraline (ZOLOFT) 100 MG tablet 30 tablet 0     Sig: Take 1 tablet (100 mg) by mouth daily Needs appt for future refills       SSRIs Protocol Failed - 8/2/2023 11:16 AM        Failed - PHQ-9 score less than 5 in past 6 months     Please review last PHQ-9 score.           Failed - Recent (6 mo) or future (30 days) visit within the authorizing provider's specialty     Patient had office visit in the last 6 months or has a visit in the next 30 days with authorizing provider or within the authorizing provider's specialty.  See \"Patient Info\" tab in inbasket, or \"Choose Columns\" in Meds & Orders section of the refill encounter.            Passed - Medication is active on med list        Passed - Patient is age 18 or older        Passed - No active pregnancy on record        Passed - No positive pregnancy test in last 12 months             Yan Gaming RN 08/02/23 11:17 AM  "

## 2023-08-03 NOTE — TELEPHONE ENCOUNTER
Patient is seeking update on refill request.  She is out of medication.    Next appt with PCP = 09/20/23  Last visit with PCP - 11/03/2021

## 2023-08-04 RX ORDER — SERTRALINE HYDROCHLORIDE 100 MG/1
100 TABLET, FILM COATED ORAL DAILY
Qty: 30 TABLET | Refills: 0 | Status: SHIPPED | OUTPATIENT
Start: 2023-08-04 | End: 2023-09-14

## 2023-08-04 NOTE — TELEPHONE ENCOUNTER
General Call    Contacts         Type Contact Phone/Fax    08/01/2023 01:48 PM CDT Fax (Incoming) [id number] - [place] - [address] (Pharmacy) [phone]    08/03/2023 11:52 AM CDT Phone (Incoming) [id number] - [address] (Pharmacy) [phone]    08/04/2023 12:29 PM CDT Phone (Incoming) Alison Damon (Self) [phone] ()          Reason for Call:  patient calling to check on status of medication refill request.      What are your questions or concerns:  patient states she has been out of medication for 2 weeks and is really in need of a refill.  She was hoping another provider could review this refill request to get it moving along.    Date of last appointment with provider: 11/03/2021    Okay to leave a detailed message?: Yes at Cell number on file:    Telephone Information:   Mobile [phone]

## 2023-09-13 ENCOUNTER — TELEPHONE (OUTPATIENT)
Dept: FAMILY MEDICINE | Facility: CLINIC | Age: 53
End: 2023-09-13

## 2023-09-13 DIAGNOSIS — F32.1 MODERATE SINGLE CURRENT EPISODE OF MAJOR DEPRESSIVE DISORDER (H): ICD-10-CM

## 2023-09-13 NOTE — TELEPHONE ENCOUNTER
Z  Medication Question or Refill    Contacts         Type Contact Phone/Fax    09/13/2023 10:21 AM CDT Phone (Incoming) Alison Damon (Self) [phone] (M)     Needs refill of Zoloft-states cannot fill until seen. Has appt. 09/20/2023. Just needs 7 days worth called in to carry through to appt. Preferred Pharmacy:[facility]            What medication are you calling about (include dose and sig)?: Zoloft    Preferred Pharmacy:   [id number] - [place] [[place]], [place] - [address]  [address] [address]  Phone: [phone] Fax: [phone]    [id number] - [place], [place] - [address]  [address] [address]  Phone: [phone] Fax: [phone]      Controlled Substance Agreement on file:   CSA -- Patient Level:     [Media Unavailable] Controlled Substance Agreement - Non - Opioid - Scan on 7/30/2019   [Media Unavailable] Controlled Substance Agreement - Opioid - Scan on 7/27/2018       Who prescribed the medication?: Dr. Justin    Do you need a refill? Yes    When did you use the medication last? Today-has 1 left only    Patient offered an appointment? No    Do you have any questions or concerns?  No      Okay to leave a detailed message?: Yes at Cell number on file:    Telephone Information:   Mobile [phone]   Mobile [phone]

## 2023-09-14 RX ORDER — SERTRALINE HYDROCHLORIDE 100 MG/1
100 TABLET, FILM COATED ORAL DAILY
Qty: 30 TABLET | Refills: 0 | Status: SHIPPED | OUTPATIENT
Start: 2023-09-14 | End: 2023-09-20

## 2023-09-20 ENCOUNTER — OFFICE VISIT (OUTPATIENT)
Dept: FAMILY MEDICINE | Facility: CLINIC | Age: 53
End: 2023-09-20

## 2023-09-20 VITALS
HEIGHT: 66 IN | TEMPERATURE: 98 F | SYSTOLIC BLOOD PRESSURE: 180 MMHG | DIASTOLIC BLOOD PRESSURE: 82 MMHG | BODY MASS INDEX: 19.73 KG/M2 | WEIGHT: 122.8 LBS | RESPIRATION RATE: 22 BRPM | OXYGEN SATURATION: 98 % | HEART RATE: 57 BPM

## 2023-09-20 DIAGNOSIS — F41.9 ANXIETY: ICD-10-CM

## 2023-09-20 DIAGNOSIS — Z00.00 ROUTINE GENERAL MEDICAL EXAMINATION AT A HEALTH CARE FACILITY: Primary | ICD-10-CM

## 2023-09-20 DIAGNOSIS — Z12.31 VISIT FOR SCREENING MAMMOGRAM: ICD-10-CM

## 2023-09-20 DIAGNOSIS — Z11.59 NEED FOR HEPATITIS C SCREENING TEST: ICD-10-CM

## 2023-09-20 DIAGNOSIS — Z12.4 CERVICAL CANCER SCREENING: ICD-10-CM

## 2023-09-20 DIAGNOSIS — Z11.4 SCREENING FOR HIV (HUMAN IMMUNODEFICIENCY VIRUS): ICD-10-CM

## 2023-09-20 DIAGNOSIS — L91.8 SKIN TAG: ICD-10-CM

## 2023-09-20 DIAGNOSIS — I10 PRIMARY HYPERTENSION: ICD-10-CM

## 2023-09-20 DIAGNOSIS — G56.02 CARPAL TUNNEL SYNDROME OF LEFT WRIST: ICD-10-CM

## 2023-09-20 DIAGNOSIS — F32.1 MODERATE SINGLE CURRENT EPISODE OF MAJOR DEPRESSIVE DISORDER (H): ICD-10-CM

## 2023-09-20 DIAGNOSIS — Z12.11 SCREEN FOR COLON CANCER: ICD-10-CM

## 2023-09-20 LAB
CHOLEST SERPL-MCNC: 287 MG/DL
HDLC SERPL-MCNC: 78 MG/DL
LDLC SERPL CALC-MCNC: 133 MG/DL
NONHDLC SERPL-MCNC: 209 MG/DL
TRIGL SERPL-MCNC: 382 MG/DL

## 2023-09-20 PROCEDURE — 87624 HPV HI-RISK TYP POOLED RSLT: CPT | Performed by: FAMILY MEDICINE

## 2023-09-20 PROCEDURE — 80061 LIPID PANEL: CPT | Performed by: FAMILY MEDICINE

## 2023-09-20 PROCEDURE — 90746 HEPB VACCINE 3 DOSE ADULT IM: CPT | Performed by: FAMILY MEDICINE

## 2023-09-20 PROCEDURE — 90677 PCV20 VACCINE IM: CPT | Performed by: FAMILY MEDICINE

## 2023-09-20 PROCEDURE — 36415 COLL VENOUS BLD VENIPUNCTURE: CPT | Performed by: FAMILY MEDICINE

## 2023-09-20 PROCEDURE — 99213 OFFICE O/P EST LOW 20 MIN: CPT | Mod: 25 | Performed by: FAMILY MEDICINE

## 2023-09-20 PROCEDURE — 87389 HIV-1 AG W/HIV-1&-2 AB AG IA: CPT | Performed by: FAMILY MEDICINE

## 2023-09-20 PROCEDURE — 90471 IMMUNIZATION ADMIN: CPT | Performed by: FAMILY MEDICINE

## 2023-09-20 PROCEDURE — 99396 PREV VISIT EST AGE 40-64: CPT | Mod: 25 | Performed by: FAMILY MEDICINE

## 2023-09-20 PROCEDURE — 90472 IMMUNIZATION ADMIN EACH ADD: CPT | Performed by: FAMILY MEDICINE

## 2023-09-20 PROCEDURE — G0145 SCR C/V CYTO,THINLAYER,RESCR: HCPCS | Performed by: FAMILY MEDICINE

## 2023-09-20 PROCEDURE — 86803 HEPATITIS C AB TEST: CPT | Performed by: FAMILY MEDICINE

## 2023-09-20 RX ORDER — LISINOPRIL 10 MG/1
10 TABLET ORAL DAILY
Qty: 90 TABLET | Refills: 0 | Status: SHIPPED | OUTPATIENT
Start: 2023-09-20

## 2023-09-20 RX ORDER — BUSPIRONE HYDROCHLORIDE 5 MG/1
5 TABLET ORAL 2 TIMES DAILY
Qty: 180 TABLET | Refills: 3 | Status: SHIPPED | OUTPATIENT
Start: 2023-09-20

## 2023-09-20 RX ORDER — SERTRALINE HYDROCHLORIDE 100 MG/1
100 TABLET, FILM COATED ORAL DAILY
Qty: 30 TABLET | Refills: 0 | Status: SHIPPED | OUTPATIENT
Start: 2023-09-20 | End: 2023-09-20

## 2023-09-20 RX ORDER — HYDROXYZINE HYDROCHLORIDE 25 MG/1
25 TABLET, FILM COATED ORAL 3 TIMES DAILY PRN
Qty: 90 TABLET | Refills: 0 | Status: SHIPPED | OUTPATIENT
Start: 2023-09-20

## 2023-09-20 ASSESSMENT — ENCOUNTER SYMPTOMS
ARTHRALGIAS: 1
ABDOMINAL PAIN: 0
CONSTIPATION: 0
FREQUENCY: 0
DYSURIA: 0
NERVOUS/ANXIOUS: 1
PALPITATIONS: 1
DIZZINESS: 1
HEMATOCHEZIA: 0
SHORTNESS OF BREATH: 0
CHILLS: 0
DIARRHEA: 1
BREAST MASS: 0
SORE THROAT: 0
NAUSEA: 0
WEAKNESS: 0
MYALGIAS: 1
FEVER: 0
EYE PAIN: 1
HEMATURIA: 0
HEARTBURN: 0
COUGH: 1
HEADACHES: 0
JOINT SWELLING: 1
PARESTHESIAS: 0

## 2023-09-20 ASSESSMENT — PATIENT HEALTH QUESTIONNAIRE - PHQ9
SUM OF ALL RESPONSES TO PHQ QUESTIONS 1-9: 14
10. IF YOU CHECKED OFF ANY PROBLEMS, HOW DIFFICULT HAVE THESE PROBLEMS MADE IT FOR YOU TO DO YOUR WORK, TAKE CARE OF THINGS AT HOME, OR GET ALONG WITH OTHER PEOPLE: VERY DIFFICULT
SUM OF ALL RESPONSES TO PHQ QUESTIONS 1-9: 14

## 2023-09-20 ASSESSMENT — PAIN SCALES - GENERAL: PAINLEVEL: NO PAIN (0)

## 2023-09-20 NOTE — LETTER
September 21, 2023      Alison Damon  [address] [address]        Dear ,    We are writing to inform you of your test results.    Dear Alison,      Here are your recent results which shows your cholesterol is elevated.  The 10-year ASCVD risk score (Bárbara DK, et al., 2019) is: 10.8%     Values used to calculate the score:       Age: 53 years       Sex: Female       Is Non- : No       Diabetic: No       Tobacco smoker: Yes       Systolic Blood Pressure: 180 mmHg       Is BP treated: Yes       HDL Cholesterol: 78 mg/dL       Total Cholesterol: 287 mg/dL   I recommend a low fat diet and fish oil tablet and we should recheck in 6 months. Please continue with your current plan of care and let us know if you have any questions or concerns.         Resulted Orders   Lipid panel reflex to direct LDL Non-fasting   Result Value Ref Range    Cholesterol 287 (H) <200 mg/dL    Triglycerides 382 (H) <150 mg/dL    Direct Measure HDL 78 >=50 mg/dL    LDL Cholesterol Calculated 133 (H) <=100 mg/dL    Non HDL Cholesterol 209 (H) <130 mg/dL    Narrative    Cholesterol  Desirable:  <200 mg/dL    Triglycerides  Normal:  Less than 150 mg/dL  Borderline High:  150-199 mg/dL  High:  200-499 mg/dL  Very High:  Greater than or equal to 500 mg/dL    Direct Measure HDL  Female:  Greater than or equal to 50 mg/dL   Male:  Greater than or equal to 40 mg/dL    LDL Cholesterol  Desirable:  <100mg/dL  Above Desirable:  100-129 mg/dL   Borderline High:  130-159 mg/dL   High:  160-189 mg/dL   Very High:  >= 190 mg/dL    Non HDL Cholesterol  Desirable:  130 mg/dL  Above Desirable:  130-159 mg/dL  Borderline High:  160-189 mg/dL  High:  190-219 mg/dL  Very High:  Greater than or equal to 220 mg/dL       If you have any questions or concerns, please call the clinic at the number listed above.       Sincerely,      Toma Justin MD

## 2023-09-20 NOTE — PROGRESS NOTES
SUBJECTIVE:   CC: Alison is an 53 year old who presents for preventive health visit.       9/20/2023     7:57 AM   Additional Questions   Roomed by Fish KRISHNAN   Accompanied by N/A       Healthy Habits:     Getting at least 3 servings of Calcium per day:  NO    Bi-annual eye exam:  NO    Dental care twice a year:  NO    Sleep apnea or symptoms of sleep apnea:  Daytime drowsiness    Diet:  Regular (no restrictions)    Frequency of exercise:  2-3 days/week    Duration of exercise:  15-30 minutes    Taking medications regularly:  Yes    Medication side effects:  None    Additional concerns today:  Yes      Today's PHQ-9 Score:       9/20/2023     7:52 AM   PHQ-9 SCORE   PHQ-9 Total Score MyChart 14 (Moderate depression)   PHQ-9 Total Score 14       Feeling more stressed recently - dog at home that step daughter foster and the dog will bark all time.  Moving.  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  But anxiety increased.  Still on zoloft not buspar.  Not sleeping well - taking the trazodone infrequently as it makes her groggy.    Stomach girgles all the time.  Feels like could be some diet.  Diarrhea but no constipation.  No solid BM - trying to eat more fiber.  Diarrhea 3-4 times a day.  Usually after eating.      Carpal tunnel issues mostly on the left.  Numbness and tingling.  Working as massage therapy.            Have you ever done Advance Care Planning? (For example, a Health Directive, POLST, or a discussion with a medical provider or your loved ones about your wishes): No, advance care planning information given to patient to review.  Advanced care planning was discussed at today's visit.    Social History     Tobacco Use    Smoking status: Some Days     Packs/day: 1.00     Years: 12.00     Pack years: 12.00     Types: Cigarettes    Smokeless tobacco: Never   Substance Use Topics    Alcohol use: Yes     Alcohol/week: 6.7 standard drinks of alcohol      Comment: Alcoholic Drinks/day: 4 drinks/drinking episode. Usually wine or beer              9/20/2023     8:03 AM   Alcohol Use   Prescreen: >3 drinks/day or >7 drinks/week? Yes   AUDIT SCORE  20         9/20/2023     8:03 AM   AUDIT - Alcohol Use Disorders Identification Test - Reproduced from the World Health Organization Audit 2001 (Second Edition)   1.  How often do you have a drink containing alcohol? 2 to 3 times a week   2.  How many drinks containing alcohol do you have on a typical day when you are drinking? 5 or 6   3.  How often do you have five or more drinks on one occasion? Weekly   4.  How often during the last year have you found that you were not able to stop drinking once you had started? Weekly   5.  How often during the last year have you failed to do what was normally expected of you because of drinking? Weekly   6.  How often during the last year have you needed a first drink in the morning to get yourself going after a heavy drinking session? Monthly   7.  How often during the last year have you had a feeling of guilt or remorse after drinking? Monthly   8.  How often during the last year have you been unable to remember what happened the night before because of your drinking? Monthly   9.  Have you or someone else been injured because of your drinking? No   10. Has a relative, friend, doctor or other health care worker been concerned about your drinking or suggested you cut down? No   TOTAL SCORE 20     Reviewed orders with patient.  Reviewed health maintenance and updated orders accordingly - Yes  Lab work is in process    Breast Cancer Screening:  Any new diagnosis of family breast, ovarian, or bowel cancer? No    FHS-7:        No data to display                  Pertinent mammograms are reviewed under the imaging tab.    History of abnormal Pap smear: Last 3 Pap and HPV Results:       7/25/2018     4:20 PM   PAP / HPV   PAP Negative for squamous intraepithelial lesion or  "malignancy  Electronically signed by Toma Valladares CT (ASCP) on 8/1/2018 at 11:23 AM            7/25/2018     4:20 PM   PAP / HPV   PAP Negative for squamous intraepithelial lesion or malignancy  Electronically signed by Toma Valladares CT (ASCP) on 8/1/2018 at 11:23 AM        Reviewed and updated as needed this visit by clinical staff   Tobacco  Allergies  Meds              Reviewed and updated as needed this visit by Provider                     Review of Systems   Constitutional:  Negative for chills and fever.   HENT:  Positive for hearing loss. Negative for congestion, ear pain and sore throat.    Eyes:  Positive for pain and visual disturbance.   Respiratory:  Positive for cough. Negative for shortness of breath.    Cardiovascular:  Positive for chest pain and palpitations. Negative for peripheral edema.   Gastrointestinal:  Positive for diarrhea. Negative for abdominal pain, constipation, heartburn, hematochezia and nausea.   Breasts:  Negative for tenderness, breast mass and discharge.   Genitourinary:  Negative for dysuria, frequency, genital sores, hematuria, pelvic pain, urgency, vaginal bleeding and vaginal discharge.   Musculoskeletal:  Positive for arthralgias, joint swelling and myalgias.   Skin:  Negative for rash.   Neurological:  Positive for dizziness. Negative for weakness, headaches and paresthesias.   Psychiatric/Behavioral:  Positive for mood changes. The patient is nervous/anxious.           OBJECTIVE:   BP (!) 188/130 (BP Location: Right arm, Patient Position: Sitting, Cuff Size: Adult Regular)   Pulse 57   Temp 98  F (36.7  C) (Oral)   Resp 22   Ht 1.676 m (5' 6\")   Wt 55.7 kg (122 lb 12.8 oz)   LMP  (LMP Unknown)   SpO2 98%   BMI 19.82 kg/m    Physical Exam  GENERAL: healthy, alert and no distress  EYES: Eyes grossly normal to inspection, PERRL and conjunctivae and sclerae normal  HENT: ear canals and TM's normal, nose and mouth without ulcers or lesions  NECK: no " adenopathy, no asymmetry, masses, or scars and thyroid normal to palpation  RESP: lungs clear to auscultation - no rales, rhonchi or wheezes  BREAST: normal without masses, tenderness or nipple discharge and no palpable axillary masses or adenopathy  CV: regular rate and rhythm, normal S1 S2, no S3 or S4, no murmur, click or rub, no peripheral edema and peripheral pulses strong  ABDOMEN: soft, nontender, no hepatosplenomegaly, no masses and bowel sounds normal  MS: no gross musculoskeletal defects noted, no edema  SKIN: no suspicious lesions or rashes  NEURO: Normal strength and tone, mentation intact and speech normal  PSYCH: mentation appears normal, affect normal/bright    Diagnostic Test Results:  Labs reviewed in Epic    ASSESSMENT/PLAN:       ICD-10-CM    1. Routine general medical examination at a health care facility  Z00.00 Lipid panel reflex to direct LDL Non-fasting     Lipid panel reflex to direct LDL Non-fasting      2. Moderate single current episode of major depressive disorder (H)  F32.1 sertraline (ZOLOFT) 50 MG tablet - will increase zoloft  and see if improvement      DISCONTINUED: sertraline (ZOLOFT) 100 MG tablet      3. Screen for colon cancer  Z12.11 Colonoscopy Screening  Referral      4. Screening for HIV (human immunodeficiency virus)  Z11.4 HIV Antigen Antibody Combo     HIV Antigen Antibody Combo      5. Need for hepatitis C screening test  Z11.59 Hepatitis C Screen Reflex to HCV RNA Quant and Genotype     Hepatitis C Screen Reflex to HCV RNA Quant and Genotype      6. Visit for screening mammogram  Z12.31 MA SCREENING DIGITAL BILAT - Future  (s+30)      7. Cervical cancer screening  Z12.4 Pap Screen with HPV - recommended age 30 - 65 years      8. Anxiety  F41.9 hydrOXYzine (ATARAX) 25 MG tablet     busPIRone (BUSPAR) 5 MG tablet      9. Skin tag  L91.8 Ob/Gyn Referral - would like to see Ob for skin tag removal      10. Carpal tunnel syndrome of left wrist  G56.02 Orthopedic   Referral - ongoing and would like to see ortho      11. Primary hypertension  I10 lisinopril (ZESTRIL) 10 MG tablet  elevated and will start lisinopril and recommend check bp at home daily and message me.  Follow up with E visit in 1 month          Patient has been advised of split billing requirements and indicates understanding: Yes      COUNSELING:  Reviewed preventive health counseling, as reflected in patient instructions       Regular exercise       Healthy diet/nutrition        She reports that she has been smoking cigarettes. She has a 12.00 pack-year smoking history. She has never used smokeless tobacco.  Nicotine/Tobacco Cessation Plan:   Information offered: Patient not interested at this time          Toma Justin MD  [facility] Care HospitalAnswers submitted by the patient for this visit:  Patient Health Questionnaire (Submitted on 9/20/2023)  If you checked off any problems, how difficult have these problems made it for you to do your work, take care of things at home, or get along with other people?: Very difficult  PHQ9 TOTAL SCORE: 14

## 2023-09-21 LAB
HCV AB SERPL QL IA: NONREACTIVE
HIV 1+2 AB+HIV1 P24 AG SERPL QL IA: NONREACTIVE

## 2023-09-22 LAB
BKR LAB AP GYN ADEQUACY: NORMAL
BKR LAB AP GYN INTERPRETATION: NORMAL
BKR LAB AP HPV REFLEX: NORMAL
BKR LAB AP PREVIOUS ABNORMAL: NORMAL
PATH REPORT.COMMENTS IMP SPEC: NORMAL
PATH REPORT.COMMENTS IMP SPEC: NORMAL
PATH REPORT.RELEVANT HX SPEC: NORMAL

## 2023-09-26 LAB
HUMAN PAPILLOMA VIRUS 16 DNA: NEGATIVE
HUMAN PAPILLOMA VIRUS 18 DNA: POSITIVE
HUMAN PAPILLOMA VIRUS FINAL DIAGNOSIS: ABNORMAL
HUMAN PAPILLOMA VIRUS OTHER HR: NEGATIVE

## 2023-09-27 ENCOUNTER — PATIENT OUTREACH (OUTPATIENT)
Dept: FAMILY MEDICINE | Facility: CLINIC | Age: 53
End: 2023-09-27

## 2023-09-27 DIAGNOSIS — R87.810 CERVICAL HIGH RISK HPV (HUMAN PAPILLOMAVIRUS) TEST POSITIVE: Primary | ICD-10-CM

## 2023-09-27 NOTE — LETTER
December 26, 2023      Angelic Damon  [address] [address]        Dear Angelic,     You are due for the following  follow up in clinic:    Colposcopy after pap smear.     We have attempted to reach you but have been unsuccessful.     Please call [phone] to schedule an appointment at your earliest convenience.      Sincerely,      [facility]

## 2023-10-24 ENCOUNTER — TELEPHONE (OUTPATIENT)
Dept: FAMILY MEDICINE | Facility: CLINIC | Age: 53
End: 2023-10-24

## 2023-12-19 NOTE — TELEPHONE ENCOUNTER
Hi Dr. Justin,   Patient is lost to pap tracking follow-up. Attempts to contact pt have been made per reminder process and there has been no reply and/or no appt scheduled.     Pap Hx:  09/20/23 NIL Pap, +HR HPV type 18 Plan [place] due bef 12/20/23 09/27/23 Phone attempt #1, no answer Left msg and Mychart result note sent.  09/29/23 Phone call attempt #2 no answer, left message for patient to call back.   10/4/23 Letter sent if no response then pt will need a certified letter.   11/21/23 Certified ltr sent. Tracking # [id number]  12/11/23 Certified ltr being returned as unclaimed # [id number]  12/19/23 Lost to follow-up for pap tracking, natalie routed to provider

## 2023-12-26 NOTE — TELEPHONE ENCOUNTER
Covering for PCP    Please send out a letter reminding patient that they need to come in for their Pap smear.    Okay to close encounter afterwards

## 2024-11-25 DIAGNOSIS — F32.1 MODERATE SINGLE CURRENT EPISODE OF MAJOR DEPRESSIVE DISORDER (H): ICD-10-CM

## 2024-11-25 NOTE — TELEPHONE ENCOUNTER
FAX Prescription Refill Request     Disp Refills Start End GUILLERMO   sertraline (ZOLOFT) 50 MG tablet 270 tablet 3 9/20/2023 -- No   Sig - Route: Take 3 tablets (150 mg) by mouth daily Needs appt for future refills - Oral   Sent to pharmacy as: Sertraline HCl 50 MG Oral Tablet (ZOLOFT)       Please note  10/28/24 Encounter - patient has not responded to phone calls to schedule appointment.

## 2025-01-14 ENCOUNTER — TELEPHONE (OUTPATIENT)
Dept: FAMILY MEDICINE | Facility: CLINIC | Age: 55
End: 2025-01-14

## 2025-01-14 DIAGNOSIS — I10 PRIMARY HYPERTENSION: Primary | ICD-10-CM

## 2025-01-14 RX ORDER — LISINOPRIL 10 MG/1
10 TABLET ORAL DAILY
Qty: 30 TABLET | Refills: 0 | Status: SHIPPED | OUTPATIENT
Start: 2025-01-14

## 2025-01-14 NOTE — TELEPHONE ENCOUNTER
Pt was seen by provider out of network for an infected toe. Provider calling to update PCP regarding hypertension.    /109 during visit and pt states she is out of medication.    Pt needs refill on Lisinopril. Any additional recommendations?    EMILIA Forde.

## 2025-01-14 NOTE — PROGRESS NOTES
Patient calling for refill of lisinopril, advised she is overdue for visit with PCP.  Patient BP too elevated to get her tooth pulled, needs extracted tomorrow.  Writer did send in 30 day supply under PCP and informed patient no further refills until she is seen for annual, patient will call back to schedule visit.   
cath/done
done

## 2025-01-15 NOTE — TELEPHONE ENCOUNTER
"\"Ok for rf of med and I'll cosign and please ask patient to make an in-person office visit for further eval and treatment.  MJB\"       Relayed Dr. Anguiano's message. Pt offered several different appointment times, states she will call back to schedule.       "